# Patient Record
Sex: FEMALE | Race: WHITE | NOT HISPANIC OR LATINO
[De-identification: names, ages, dates, MRNs, and addresses within clinical notes are randomized per-mention and may not be internally consistent; named-entity substitution may affect disease eponyms.]

---

## 2017-02-22 ENCOUNTER — APPOINTMENT (OUTPATIENT)
Dept: ORTHOPEDIC SURGERY | Facility: CLINIC | Age: 32
End: 2017-02-22

## 2017-02-22 VITALS
WEIGHT: 166 LBS | DIASTOLIC BLOOD PRESSURE: 75 MMHG | HEIGHT: 64 IN | SYSTOLIC BLOOD PRESSURE: 119 MMHG | BODY MASS INDEX: 28.34 KG/M2 | HEART RATE: 99 BPM

## 2017-02-22 DIAGNOSIS — Z87.891 PERSONAL HISTORY OF NICOTINE DEPENDENCE: ICD-10-CM

## 2017-02-22 DIAGNOSIS — Z80.9 FAMILY HISTORY OF MALIGNANT NEOPLASM, UNSPECIFIED: ICD-10-CM

## 2017-02-22 DIAGNOSIS — Z78.9 OTHER SPECIFIED HEALTH STATUS: ICD-10-CM

## 2017-02-28 ENCOUNTER — APPOINTMENT (OUTPATIENT)
Dept: ORTHOPEDIC SURGERY | Facility: CLINIC | Age: 32
End: 2017-02-28

## 2017-02-28 VITALS
HEIGHT: 64 IN | HEART RATE: 98 BPM | WEIGHT: 166 LBS | SYSTOLIC BLOOD PRESSURE: 100 MMHG | DIASTOLIC BLOOD PRESSURE: 71 MMHG | BODY MASS INDEX: 28.34 KG/M2

## 2017-02-28 DIAGNOSIS — M65.4 RADIAL STYLOID TENOSYNOVITIS [DE QUERVAIN]: ICD-10-CM

## 2017-06-06 ENCOUNTER — APPOINTMENT (OUTPATIENT)
Dept: OTOLARYNGOLOGY | Facility: CLINIC | Age: 32
End: 2017-06-06

## 2017-09-05 ENCOUNTER — APPOINTMENT (OUTPATIENT)
Dept: PSYCHIATRY | Facility: CLINIC | Age: 32
End: 2017-09-05
Payer: COMMERCIAL

## 2017-09-05 PROCEDURE — 99205 OFFICE O/P NEW HI 60 MIN: CPT

## 2017-09-22 ENCOUNTER — APPOINTMENT (OUTPATIENT)
Dept: OBGYN | Facility: CLINIC | Age: 32
End: 2017-09-22
Payer: COMMERCIAL

## 2017-09-22 VITALS
BODY MASS INDEX: 29.02 KG/M2 | HEART RATE: 57 BPM | DIASTOLIC BLOOD PRESSURE: 58 MMHG | HEIGHT: 64 IN | SYSTOLIC BLOOD PRESSURE: 100 MMHG | WEIGHT: 170 LBS | OXYGEN SATURATION: 99 %

## 2017-09-22 DIAGNOSIS — Z82.3 FAMILY HISTORY OF STROKE: ICD-10-CM

## 2017-09-22 LAB
HCG UR QL: NEGATIVE
QUALITY CONTROL: NO

## 2017-09-22 PROCEDURE — 99385 PREV VISIT NEW AGE 18-39: CPT

## 2017-09-24 LAB
ESTRADIOL SERPL-MCNC: 88 PG/ML
FSH SERPL-MCNC: 5.1 IU/L
PROLACTIN SERPL-MCNC: 8.6 NG/ML
TSH SERPL-ACNC: 1.97 UIU/ML

## 2017-09-26 ENCOUNTER — OTHER (OUTPATIENT)
Age: 32
End: 2017-09-26

## 2017-09-26 LAB
CYTOLOGY CVX/VAG DOC THIN PREP: NORMAL
HPV HIGH+LOW RISK DNA PNL CVX: POSITIVE

## 2017-10-03 ENCOUNTER — ASOB RESULT (OUTPATIENT)
Age: 32
End: 2017-10-03

## 2017-10-03 ENCOUNTER — APPOINTMENT (OUTPATIENT)
Dept: OBGYN | Facility: CLINIC | Age: 32
End: 2017-10-03
Payer: COMMERCIAL

## 2017-10-03 ENCOUNTER — APPOINTMENT (OUTPATIENT)
Dept: PSYCHIATRY | Facility: CLINIC | Age: 32
End: 2017-10-03
Payer: COMMERCIAL

## 2017-10-03 PROCEDURE — 99214 OFFICE O/P EST MOD 30 MIN: CPT

## 2017-10-03 PROCEDURE — 76830 TRANSVAGINAL US NON-OB: CPT

## 2017-10-03 RX ORDER — FLUOXETINE HYDROCHLORIDE 20 MG/1
20 TABLET ORAL
Qty: 30 | Refills: 0 | Status: DISCONTINUED | COMMUNITY
Start: 2017-10-01

## 2017-10-05 ENCOUNTER — OTHER (OUTPATIENT)
Age: 32
End: 2017-10-05

## 2017-12-04 ENCOUNTER — APPOINTMENT (OUTPATIENT)
Dept: PSYCHIATRY | Facility: CLINIC | Age: 32
End: 2017-12-04
Payer: COMMERCIAL

## 2017-12-04 PROCEDURE — 99214 OFFICE O/P EST MOD 30 MIN: CPT

## 2017-12-04 RX ORDER — LORAZEPAM 0.5 MG/1
0.5 TABLET ORAL DAILY
Qty: 10 | Refills: 0 | Status: DISCONTINUED | COMMUNITY
Start: 2017-09-05 | End: 2017-12-04

## 2018-01-22 ENCOUNTER — APPOINTMENT (OUTPATIENT)
Dept: PSYCHIATRY | Facility: CLINIC | Age: 33
End: 2018-01-22
Payer: COMMERCIAL

## 2018-01-22 PROCEDURE — 99214 OFFICE O/P EST MOD 30 MIN: CPT

## 2018-02-26 ENCOUNTER — APPOINTMENT (OUTPATIENT)
Dept: PSYCHIATRY | Facility: CLINIC | Age: 33
End: 2018-02-26
Payer: COMMERCIAL

## 2018-02-26 PROCEDURE — 99214 OFFICE O/P EST MOD 30 MIN: CPT

## 2018-05-29 ENCOUNTER — APPOINTMENT (OUTPATIENT)
Dept: PSYCHIATRY | Facility: CLINIC | Age: 33
End: 2018-05-29
Payer: COMMERCIAL

## 2018-05-29 DIAGNOSIS — O99.340 OTHER MENTAL DISORDERS COMPLICATING PREGNANCY, UNSPECIFIED TRIMESTER: ICD-10-CM

## 2018-05-29 DIAGNOSIS — F32.9 OTHER MENTAL DISORDERS COMPLICATING PREGNANCY, UNSPECIFIED TRIMESTER: ICD-10-CM

## 2018-05-29 PROCEDURE — 99214 OFFICE O/P EST MOD 30 MIN: CPT

## 2018-10-11 ENCOUNTER — APPOINTMENT (OUTPATIENT)
Dept: PSYCHIATRY | Facility: CLINIC | Age: 33
End: 2018-10-11
Payer: COMMERCIAL

## 2018-10-11 PROCEDURE — 99213 OFFICE O/P EST LOW 20 MIN: CPT

## 2018-10-11 RX ORDER — FLUOXETINE HYDROCHLORIDE 20 MG/1
20 CAPSULE ORAL
Qty: 30 | Refills: 2 | Status: DISCONTINUED | COMMUNITY
Start: 2018-05-29 | End: 2018-10-11

## 2018-10-11 RX ORDER — FLUOXETINE HYDROCHLORIDE 10 MG/1
10 CAPSULE ORAL
Qty: 30 | Refills: 2 | Status: DISCONTINUED | COMMUNITY
Start: 2017-09-05 | End: 2018-10-11

## 2019-03-20 ENCOUNTER — APPOINTMENT (OUTPATIENT)
Dept: PSYCHIATRY | Facility: CLINIC | Age: 34
End: 2019-03-20
Payer: COMMERCIAL

## 2019-03-20 PROCEDURE — 99214 OFFICE O/P EST MOD 30 MIN: CPT

## 2019-06-12 ENCOUNTER — APPOINTMENT (OUTPATIENT)
Dept: PSYCHIATRY | Facility: CLINIC | Age: 34
End: 2019-06-12

## 2019-10-03 ENCOUNTER — APPOINTMENT (OUTPATIENT)
Dept: INTERNAL MEDICINE | Facility: CLINIC | Age: 34
End: 2019-10-03
Payer: COMMERCIAL

## 2019-10-03 ENCOUNTER — TRANSCRIPTION ENCOUNTER (OUTPATIENT)
Age: 34
End: 2019-10-03

## 2019-10-03 ENCOUNTER — NON-APPOINTMENT (OUTPATIENT)
Age: 34
End: 2019-10-03

## 2019-10-03 VITALS
HEIGHT: 65 IN | HEART RATE: 108 BPM | OXYGEN SATURATION: 99 % | SYSTOLIC BLOOD PRESSURE: 110 MMHG | WEIGHT: 148 LBS | BODY MASS INDEX: 24.66 KG/M2 | DIASTOLIC BLOOD PRESSURE: 70 MMHG

## 2019-10-03 DIAGNOSIS — Z71.89 OTHER SPECIFIED COUNSELING: ICD-10-CM

## 2019-10-03 PROCEDURE — 93000 ELECTROCARDIOGRAM COMPLETE: CPT

## 2019-10-03 PROCEDURE — 99385 PREV VISIT NEW AGE 18-39: CPT | Mod: 25

## 2019-10-03 NOTE — REVIEW OF SYSTEMS
[Negative] : Heme/Lymph [Chest Pain] : no chest pain [Lower Ext Edema] : no lower extremity edema [FreeTextEntry5] : occ palpitations - occurs in the setting of stressful situations

## 2019-10-03 NOTE — HISTORY OF PRESENT ILLNESS
[FreeTextEntry1] : here to establish care and for cpe [de-identified] : has been a few years since her last CPE but keeps UTD with her medical care.  sees gyn regularly.

## 2019-10-03 NOTE — HEALTH RISK ASSESSMENT
[6-10] : 6-10 [No falls in past year] : Patient reported no falls in the past year [Patient reported PAP Smear was normal] : Patient reported PAP Smear was normal [HIV Test offered] : HIV Test offered [Hepatitis C test offered] : Hepatitis C test offered [None] : None [With Family] : lives with family [Employed] : employed [] :  [# Of Children ___] : has [unfilled] children [Sexually Active] : sexually active [Fully functional (bathing, dressing, toileting, transferring, walking, feeding)] : Fully functional (bathing, dressing, toileting, transferring, walking, feeding) [Feels Safe at Home] : Feels safe at home [Fully functional (using the telephone, shopping, preparing meals, housekeeping, doing laundry, using] : Fully functional and needs no help or supervision to perform IADLs (using the telephone, shopping, preparing meals, housekeeping, doing laundry, using transportation, managing medications and managing finances) [Smoke Detector] : smoke detector [Seat Belt] :  uses seat belt [Carbon Monoxide Detector] : carbon monoxide detector [Name: ___] : Health Care Proxy's Name: [unfilled]  [With Patient/Caregiver] : With Patient/Caregiver [Relationship: ___] : Relationship: [unfilled] [de-identified] : none [] : No [YearQuit] : 2014 [de-identified] : see sbirt [de-identified] : walks daily, spinning [de-identified] : varied, tries to limit carrbs [Change in mental status noted] : No change in mental status noted [Reports changes in hearing] : Reports no changes in hearing [Guns at Home] : no guns at home [Reports changes in vision] : Reports no changes in vision [MammogramComments] : na [PapSmearDate] : 9/17 [PapSmearComments] : has f/u scheduled in December [BoneDensityComments] : na [ColonoscopyComments] : na [de-identified] : son, , dog [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

## 2019-10-03 NOTE — PHYSICAL EXAM
[No Lymphadenopathy] : no lymphadenopathy [Supple] : supple [No Respiratory Distress] : no respiratory distress  [Clear to Auscultation] : lungs were clear to auscultation bilaterally [No Abdominal Bruit] : a ~M bruit was not heard ~T in the abdomen [No Edema] : there was no peripheral edema [Pedal Pulses Present] : the pedal pulses are present [Normal Appearance] : normal in appearance [No Masses] : no palpable masses [No Axillary Lymphadenopathy] : no axillary lymphadenopathy [Soft] : abdomen soft [Non Tender] : non-tender [Normal Bowel Sounds] : normal bowel sounds [Normal Posterior Cervical Nodes] : no posterior cervical lymphadenopathy [Normal Anterior Cervical Nodes] : no anterior cervical lymphadenopathy [No CVA Tenderness] : no CVA  tenderness [No Spinal Tenderness] : no spinal tenderness [No Joint Swelling] : no joint swelling [Grossly Normal Strength/Tone] : grossly normal strength/tone [No Rash] : no rash [Normal] : normal texture and mobility [No Focal Deficits] : no focal deficits [Coordination Grossly Intact] : coordination grossly intact [Normal Gait] : normal gait [Normal Affect] : the affect was normal [Deep Tendon Reflexes (DTR)] : deep tendon reflexes were 2+ and symmetric [Normal Insight/Judgement] : insight and judgment were intact [de-identified] : neck non-tender [de-identified] : no skin changes

## 2019-10-09 ENCOUNTER — TRANSCRIPTION ENCOUNTER (OUTPATIENT)
Age: 34
End: 2019-10-09

## 2019-10-09 LAB
ALBUMIN SERPL ELPH-MCNC: 4.9 G/DL
ALP BLD-CCNC: 62 U/L
ALT SERPL-CCNC: 12 U/L
ANION GAP SERPL CALC-SCNC: 14 MMOL/L
AST SERPL-CCNC: 18 U/L
BASOPHILS # BLD AUTO: 0.06 K/UL
BASOPHILS NFR BLD AUTO: 0.6 %
BILIRUB SERPL-MCNC: 1.1 MG/DL
BUN SERPL-MCNC: 20 MG/DL
CALCIUM SERPL-MCNC: 10.1 MG/DL
CHLORIDE SERPL-SCNC: 102 MMOL/L
CHOLEST SERPL-MCNC: 224 MG/DL
CHOLEST/HDLC SERPL: 2.6 RATIO
CO2 SERPL-SCNC: 25 MMOL/L
CREAT SERPL-MCNC: 0.87 MG/DL
EOSINOPHIL # BLD AUTO: 0.14 K/UL
EOSINOPHIL NFR BLD AUTO: 1.5 %
ESTIMATED AVERAGE GLUCOSE: 94 MG/DL
GLUCOSE SERPL-MCNC: 88 MG/DL
HBA1C MFR BLD HPLC: 4.9 %
HCT VFR BLD CALC: 39.6 %
HCV AB SER QL: NONREACTIVE
HCV S/CO RATIO: 0.16 S/CO
HDLC SERPL-MCNC: 86 MG/DL
HGB BLD-MCNC: 13.2 G/DL
HIV1+2 AB SPEC QL IA.RAPID: NONREACTIVE
IMM GRANULOCYTES NFR BLD AUTO: 0.3 %
LDLC SERPL CALC-MCNC: 127 MG/DL
LYMPHOCYTES # BLD AUTO: 2.38 K/UL
LYMPHOCYTES NFR BLD AUTO: 24.8 %
MAN DIFF?: NORMAL
MCHC RBC-ENTMCNC: 31.2 PG
MCHC RBC-ENTMCNC: 33.3 GM/DL
MCV RBC AUTO: 93.6 FL
MONOCYTES # BLD AUTO: 0.8 K/UL
MONOCYTES NFR BLD AUTO: 8.3 %
NEUTROPHILS # BLD AUTO: 6.2 K/UL
NEUTROPHILS NFR BLD AUTO: 64.5 %
PLATELET # BLD AUTO: 307 K/UL
POTASSIUM SERPL-SCNC: 5.1 MMOL/L
PROT SERPL-MCNC: 7.5 G/DL
RBC # BLD: 4.23 M/UL
RBC # FLD: 12.4 %
SODIUM SERPL-SCNC: 141 MMOL/L
TRIGL SERPL-MCNC: 57 MG/DL
TSH SERPL-ACNC: 2.29 UIU/ML
WBC # FLD AUTO: 9.61 K/UL

## 2019-12-19 ENCOUNTER — APPOINTMENT (OUTPATIENT)
Dept: OBGYN | Facility: CLINIC | Age: 34
End: 2019-12-19
Payer: COMMERCIAL

## 2019-12-19 VITALS
SYSTOLIC BLOOD PRESSURE: 110 MMHG | DIASTOLIC BLOOD PRESSURE: 70 MMHG | HEART RATE: 78 BPM | WEIGHT: 154 LBS | OXYGEN SATURATION: 98 % | HEIGHT: 65 IN | BODY MASS INDEX: 25.66 KG/M2

## 2019-12-19 DIAGNOSIS — Z87.42 PERSONAL HISTORY OF OTHER DISEASES OF THE FEMALE GENITAL TRACT: ICD-10-CM

## 2019-12-19 PROCEDURE — 99395 PREV VISIT EST AGE 18-39: CPT

## 2019-12-19 RX ORDER — DOCUSATE SODIUM 100 MG/1
CAPSULE ORAL
Refills: 0 | Status: DISCONTINUED | COMMUNITY
End: 2019-12-19

## 2019-12-19 RX ORDER — PRENATAL VIT 49/IRON FUM/FOLIC 6.75-0.2MG
TABLET ORAL
Refills: 0 | Status: DISCONTINUED | COMMUNITY
End: 2019-12-19

## 2019-12-19 RX ORDER — FLUOXETINE HYDROCHLORIDE 20 MG/1
20 CAPSULE ORAL
Qty: 90 | Refills: 0 | Status: DISCONTINUED | COMMUNITY
Start: 2019-03-20 | End: 2019-12-19

## 2019-12-19 NOTE — HISTORY OF PRESENT ILLNESS
[Good] : being in good health [Reproductive Age] : is of reproductive age [Sexually Active] : is sexually active [Last Pap ___] : Last cervical pap smear was [unfilled] [Contraception] : does not use contraception [No] : no

## 2019-12-19 NOTE — CHIEF COMPLAINT
[Annual Visit] : annual visit [FreeTextEntry1] : c/s and son 3 years old. in bank in [facility]..conceived IUI. [place]ized [place] [facility] near [facility]--known for hot springs/skiing\par

## 2019-12-19 NOTE — COUNSELING
[Nutrition] : nutrition [Exercise] : exercise [Breast Self Exam] : breast self exam [Contraception] : contraception [Vitamins/Supplements] : vitamins/supplements

## 2019-12-20 LAB — HPV HIGH+LOW RISK DNA PNL CVX: NOT DETECTED

## 2019-12-26 LAB — CYTOLOGY CVX/VAG DOC THIN PREP: NORMAL

## 2020-01-03 ENCOUNTER — TRANSCRIPTION ENCOUNTER (OUTPATIENT)
Age: 35
End: 2020-01-03

## 2021-01-13 ENCOUNTER — APPOINTMENT (OUTPATIENT)
Dept: OBGYN | Facility: CLINIC | Age: 36
End: 2021-01-13
Payer: COMMERCIAL

## 2021-01-13 VITALS
OXYGEN SATURATION: 98 % | BODY MASS INDEX: 23.82 KG/M2 | SYSTOLIC BLOOD PRESSURE: 116 MMHG | HEART RATE: 97 BPM | TEMPERATURE: 98 F | HEIGHT: 65 IN | DIASTOLIC BLOOD PRESSURE: 74 MMHG | WEIGHT: 143 LBS

## 2021-01-13 PROCEDURE — 99072 ADDL SUPL MATRL&STAF TM PHE: CPT

## 2021-01-13 PROCEDURE — 99395 PREV VISIT EST AGE 18-39: CPT

## 2021-01-13 RX ORDER — B-COMPLEX WITH VITAMIN C
TABLET ORAL
Refills: 0 | Status: DISCONTINUED | COMMUNITY
End: 2021-01-13

## 2021-01-13 NOTE — COUNSELING
[Nutrition/ Exercise/ Weight Management] : nutrition, exercise, weight management [Vitamins/Supplements] : vitamins/supplements [Breast Self Exam] : breast self exam [Preconception Care/ Fertility options] : preconception care, fertility options

## 2021-01-13 NOTE — HISTORY OF PRESENT ILLNESS
[TextBox_4] : son 3yo\par all family in [facility]--doing well.[place] a [place] [PapSmeardate] : 2019 [Currently Active] : currently active [FreeTextEntry3] : no

## 2021-02-18 ENCOUNTER — NON-APPOINTMENT (OUTPATIENT)
Age: 36
End: 2021-02-18

## 2021-02-19 ENCOUNTER — APPOINTMENT (OUTPATIENT)
Dept: INTERNAL MEDICINE | Facility: CLINIC | Age: 36
End: 2021-02-19

## 2021-02-22 ENCOUNTER — NON-APPOINTMENT (OUTPATIENT)
Age: 36
End: 2021-02-22

## 2021-03-02 NOTE — HEALTH RISK ASSESSMENT
[Patient reported PAP Smear was normal] : Patient reported PAP Smear was normal [PapSmearDate] : 12/19 [BoneDensityComments] : na [ColonoscopyComments] : na

## 2021-04-16 ENCOUNTER — APPOINTMENT (OUTPATIENT)
Dept: INTERNAL MEDICINE | Facility: CLINIC | Age: 36
End: 2021-04-16

## 2021-07-30 ENCOUNTER — APPOINTMENT (OUTPATIENT)
Dept: INTERNAL MEDICINE | Facility: CLINIC | Age: 36
End: 2021-07-30
Payer: COMMERCIAL

## 2021-07-30 ENCOUNTER — NON-APPOINTMENT (OUTPATIENT)
Age: 36
End: 2021-07-30

## 2021-07-30 VITALS
BODY MASS INDEX: 26.33 KG/M2 | DIASTOLIC BLOOD PRESSURE: 80 MMHG | SYSTOLIC BLOOD PRESSURE: 120 MMHG | WEIGHT: 158 LBS | HEIGHT: 65 IN

## 2021-07-30 DIAGNOSIS — R00.2 PALPITATIONS: ICD-10-CM

## 2021-07-30 DIAGNOSIS — Z01.419 ENCOUNTER FOR GYNECOLOGICAL EXAMINATION (GENERAL) (ROUTINE) W/OUT ABNORMAL FINDINGS: ICD-10-CM

## 2021-07-30 DIAGNOSIS — Z00.00 ENCOUNTER FOR GENERAL ADULT MEDICAL EXAMINATION W/OUT ABNORMAL FINDINGS: ICD-10-CM

## 2021-07-30 DIAGNOSIS — R42 DIZZINESS AND GIDDINESS: ICD-10-CM

## 2021-07-30 PROCEDURE — 83036 HEMOGLOBIN GLYCOSYLATED A1C: CPT | Mod: QW

## 2021-07-30 PROCEDURE — 99395 PREV VISIT EST AGE 18-39: CPT | Mod: 25

## 2021-07-30 PROCEDURE — 36415 COLL VENOUS BLD VENIPUNCTURE: CPT

## 2021-07-30 PROCEDURE — G0442 ANNUAL ALCOHOL SCREEN 15 MIN: CPT

## 2021-07-30 PROCEDURE — 93000 ELECTROCARDIOGRAM COMPLETE: CPT | Mod: 59

## 2021-08-01 PROBLEM — R00.2 PALPITATIONS: Status: ACTIVE | Noted: 2019-10-03

## 2021-08-01 PROBLEM — R42 INTERMITTENT LIGHTHEADEDNESS: Status: ACTIVE | Noted: 2021-07-30

## 2021-08-01 NOTE — ASSESSMENT
[FreeTextEntry1] : Annual alcohol screen completed this visit. Alcohol use within healthy limits.  Healthy drinking guidelines shared with patient (Female and male overage 65 no more than 3 SSD on any day, no more than 7 per week). Positive reinforcement provided given patient currently mostly within healthy guidelines. She intends to cut out ETOH once she gets pregnant\par \par Annual depression screen completed this visit.  PHQ 9 completed and reviewed.  Screening suggestive of diagnosis of mild MDD. - see above RE tx

## 2021-08-01 NOTE — HEALTH RISK ASSESSMENT
[4 or more  times a week (4 pts)] : 4 or more  times a week (4 points) [1 or 2 (0 pts)] : 1 or 2 (0 points) [Never (0 pts)] : Never (0 points) [No falls in past year] : Patient reported no falls in the past year [Patient reported PAP Smear was normal] : Patient reported PAP Smear was normal [HIV test declined] : HIV test declined [Hepatitis C test declined] : Hepatitis C test declined [None] : None [With Family] : lives with family [Employed] : employed [] :  [Sexually Active] : sexually active [Feels Safe at Home] : Feels safe at home [Fully functional (bathing, dressing, toileting, transferring, walking, feeding)] : Fully functional (bathing, dressing, toileting, transferring, walking, feeding) [Fully functional (using the telephone, shopping, preparing meals, housekeeping, doing laundry, using] : Fully functional and needs no help or supervision to perform IADLs (using the telephone, shopping, preparing meals, housekeeping, doing laundry, using transportation, managing medications and managing finances) [Smoke Detector] : smoke detector [Seat Belt] :  uses seat belt [Designated Healthcare Proxy] : Designated healthcare proxy [Name: ___] : Health Care Proxy's Name: [unfilled]  [Relationship: ___] : Relationship: [unfilled] [Several Days (1)] : 6.) Feeling bad about yourself, or that you are a failure, or have let yourself or your family down? Several days [1/2 of Days or More (2)] : 7.) Trouble concentrating on things, such as reading a newspaper or watching television? Half the days or more [Not at All (0)] : 8.) Moving or speaking so slowly that other people could have noticed, or the opposite, moving or speaking faster than usual? Not at all [Moderate] : severity of depression is moderate [Somewhat Difficult] : How difficult have these problems made it for you to do your work, take care of things at home, or get along with people? Somewhat difficult [PHQ-9 Positive] : PHQ-9 Positive [I have developed a follow-up plan documented below in the note.] : I have developed a follow-up plan documented below in the note. [] : No [Audit-CScore] : 4 [de-identified] : walking - wt training 3-5 times a week [de-identified] : varied [[id number]] : 10 [Change in mental status noted] : No change in mental status noted [Reports changes in hearing] : Reports no changes in hearing [Reports changes in vision] : Reports no changes in vision [MammogramComments] : na [PapSmearDate] : 12/19 [BoneDensityComments] : na [ColonoscopyComments] : na [de-identified] : [facility] [de-identified] : working remotely [AdvancecareDate] : 07/21

## 2021-08-01 NOTE — PHYSICAL EXAM
[No Acute Distress] : no acute distress [Well-Appearing] : well-appearing [Normal Sclera/Conjunctiva] : normal sclera/conjunctiva [PERRL] : pupils equal round and reactive to light [EOMI] : extraocular movements intact [No Lymphadenopathy] : no lymphadenopathy [Supple] : supple [No Respiratory Distress] : no respiratory distress  [Clear to Auscultation] : lungs were clear to auscultation bilaterally [Normal Rate] : normal rate  [Regular Rhythm] : with a regular rhythm [Normal S1, S2] : normal S1 and S2 [Normal] : normal rate, regular rhythm, normal S1 and S2 and no murmur heard [No Abdominal Bruit] : a ~M bruit was not heard ~T in the abdomen [Pedal Pulses Present] : the pedal pulses are present [No Edema] : there was no peripheral edema [Normal Appearance] : normal in appearance [No Masses] : no palpable masses [No Axillary Lymphadenopathy] : no axillary lymphadenopathy [Soft] : abdomen soft [Non Tender] : non-tender [Normal Bowel Sounds] : normal bowel sounds [Normal Axillary Nodes] : no axillary lymphadenopathy [Normal Posterior Cervical Nodes] : no posterior cervical lymphadenopathy [Normal Anterior Cervical Nodes] : no anterior cervical lymphadenopathy [No CVA Tenderness] : no CVA  tenderness [No Spinal Tenderness] : no spinal tenderness [No Joint Swelling] : no joint swelling [Grossly Normal Strength/Tone] : grossly normal strength/tone [No Rash] : no rash [Coordination Grossly Intact] : coordination grossly intact [No Focal Deficits] : no focal deficits [Normal Gait] : normal gait [Deep Tendon Reflexes (DTR)] : deep tendon reflexes were 2+ and symmetric [Normal Affect] : the affect was normal [Normal Insight/Judgement] : insight and judgment were intact [de-identified] : No skin changes

## 2021-08-01 NOTE — HISTORY OF PRESENT ILLNESS
[FreeTextEntry1] : annual cpe [de-identified] : In addition:\par c/o occ episodes of lightheadedness - very brief - just lasting a few seconds- no assoc sx of CP, syncope or near syncope.  Not quite vertigo but a brief feeling of unsteadiness.  Although she does have issues with anxiety, these brief episodes do not only occur when she get anxious.  happened the other day when she was calm and relaxed. maybe once a week.\par also c/o anxiety sx - occ palpitations with that. off prozac since 1/2019. Has a psychiatrist and therapist to monitor depression and anxiety - weighing her options as she is also trying to get pregnant so she is hoping to staff off medication if possible.\par had a miscarriage this past year.  \par Currently doing in vitro - taking multiple hormones, vitamins, and ASA 81 mg\par fully vaccinagted

## 2021-08-10 LAB
ALBUMIN SERPL ELPH-MCNC: 4.7 G/DL
ALP BLD-CCNC: 47 U/L
ALT SERPL-CCNC: 12 U/L
ANION GAP SERPL CALC-SCNC: 16 MMOL/L
AST SERPL-CCNC: 18 U/L
BASOPHILS # BLD AUTO: 0.06 K/UL
BASOPHILS NFR BLD AUTO: 0.7 %
BILIRUB SERPL-MCNC: 1.1 MG/DL
BUN SERPL-MCNC: 14 MG/DL
CALCIUM SERPL-MCNC: 9.6 MG/DL
CHLORIDE SERPL-SCNC: 104 MMOL/L
CO2 SERPL-SCNC: 23 MMOL/L
CREAT SERPL-MCNC: 0.9 MG/DL
EOSINOPHIL # BLD AUTO: 0.09 K/UL
EOSINOPHIL NFR BLD AUTO: 1.1 %
ESTIMATED AVERAGE GLUCOSE: 94 MG/DL
GLUCOSE SERPL-MCNC: 67 MG/DL
HBA1C MFR BLD HPLC: 4.9 %
HCT VFR BLD CALC: 37.5 %
HGB BLD-MCNC: 12.6 G/DL
IMM GRANULOCYTES NFR BLD AUTO: 0.4 %
LYMPHOCYTES # BLD AUTO: 1.94 K/UL
LYMPHOCYTES NFR BLD AUTO: 23.6 %
MAN DIFF?: NORMAL
MCHC RBC-ENTMCNC: 31.7 PG
MCHC RBC-ENTMCNC: 33.6 GM/DL
MCV RBC AUTO: 94.2 FL
MONOCYTES # BLD AUTO: 0.57 K/UL
MONOCYTES NFR BLD AUTO: 6.9 %
NEUTROPHILS # BLD AUTO: 5.54 K/UL
NEUTROPHILS NFR BLD AUTO: 67.3 %
PLATELET # BLD AUTO: 296 K/UL
POTASSIUM SERPL-SCNC: 4.7 MMOL/L
PROT SERPL-MCNC: 7.2 G/DL
RBC # BLD: 3.98 M/UL
RBC # FLD: 12.4 %
SODIUM SERPL-SCNC: 143 MMOL/L
TSH SERPL-ACNC: 2.35 UIU/ML
WBC # FLD AUTO: 8.23 K/UL

## 2021-10-26 ENCOUNTER — OUTPATIENT (OUTPATIENT)
Dept: OUTPATIENT SERVICES | Facility: HOSPITAL | Age: 36
LOS: 1 days | End: 2021-10-26
Payer: COMMERCIAL

## 2021-10-26 ENCOUNTER — TRANSCRIPTION ENCOUNTER (OUTPATIENT)
Age: 36
End: 2021-10-26

## 2021-10-26 VITALS
HEART RATE: 103 BPM | WEIGHT: 160.06 LBS | OXYGEN SATURATION: 99 % | DIASTOLIC BLOOD PRESSURE: 72 MMHG | HEIGHT: 65 IN | TEMPERATURE: 98 F | RESPIRATION RATE: 18 BRPM | SYSTOLIC BLOOD PRESSURE: 102 MMHG

## 2021-10-26 DIAGNOSIS — Z98.891 HISTORY OF UTERINE SCAR FROM PREVIOUS SURGERY: Chronic | ICD-10-CM

## 2021-10-26 DIAGNOSIS — Z11.52 ENCOUNTER FOR SCREENING FOR COVID-19: ICD-10-CM

## 2021-10-26 DIAGNOSIS — Z98.890 OTHER SPECIFIED POSTPROCEDURAL STATES: Chronic | ICD-10-CM

## 2021-10-26 DIAGNOSIS — O02.1 MISSED ABORTION: ICD-10-CM

## 2021-10-26 PROCEDURE — U0003: CPT

## 2021-10-26 PROCEDURE — 86901 BLOOD TYPING SEROLOGIC RH(D): CPT

## 2021-10-26 PROCEDURE — 86900 BLOOD TYPING SEROLOGIC ABO: CPT

## 2021-10-26 PROCEDURE — U0005: CPT

## 2021-10-26 PROCEDURE — 85027 COMPLETE CBC AUTOMATED: CPT

## 2021-10-26 PROCEDURE — C9803: CPT

## 2021-10-26 PROCEDURE — 86850 RBC ANTIBODY SCREEN: CPT

## 2021-10-26 PROCEDURE — G0463: CPT

## 2021-10-26 RX ORDER — SODIUM CHLORIDE 9 MG/ML
3 INJECTION INTRAMUSCULAR; INTRAVENOUS; SUBCUTANEOUS EVERY 8 HOURS
Refills: 0 | Status: DISCONTINUED | OUTPATIENT
Start: 2021-10-27 | End: 2021-11-10

## 2021-10-26 RX ORDER — LIDOCAINE HCL 20 MG/ML
0.2 VIAL (ML) INJECTION ONCE
Refills: 0 | Status: DISCONTINUED | OUTPATIENT
Start: 2021-10-27 | End: 2021-11-10

## 2021-10-26 NOTE — H&P PST ADULT - HISTORY OF PRESENT ILLNESS
36yr old female with Miss Ab at 11wks gestation. Pt had a previous  D&E 15 wks 2021 . Coming in for D&C for missed ab.  No sig medical hx.    Note; covid test 10/26/21 [facility]

## 2021-10-26 NOTE — H&P PST ADULT - ATTENDING COMMENTS
37yo  at 12wk with missed  measuring 11w0d.   pt seen and evaluated by me. agree with above.   for dilation and curettage.   A pos blood type.   discussed r/b/a of procedure.   all questions and concerns addressed.   office follow up in 2 wks.   Kingsley BLACKBURN

## 2021-10-27 ENCOUNTER — OUTPATIENT (OUTPATIENT)
Dept: OUTPATIENT SERVICES | Facility: HOSPITAL | Age: 36
LOS: 1 days | End: 2021-10-27
Payer: COMMERCIAL

## 2021-10-27 ENCOUNTER — RESULT REVIEW (OUTPATIENT)
Age: 36
End: 2021-10-27

## 2021-10-27 VITALS
RESPIRATION RATE: 16 BRPM | SYSTOLIC BLOOD PRESSURE: 122 MMHG | HEIGHT: 65 IN | OXYGEN SATURATION: 100 % | DIASTOLIC BLOOD PRESSURE: 70 MMHG | HEART RATE: 86 BPM | WEIGHT: 160.06 LBS | TEMPERATURE: 98 F

## 2021-10-27 VITALS
DIASTOLIC BLOOD PRESSURE: 59 MMHG | HEART RATE: 86 BPM | SYSTOLIC BLOOD PRESSURE: 98 MMHG | RESPIRATION RATE: 18 BRPM | OXYGEN SATURATION: 100 % | TEMPERATURE: 97 F

## 2021-10-27 DIAGNOSIS — O02.1 MISSED ABORTION: ICD-10-CM

## 2021-10-27 DIAGNOSIS — Z98.890 OTHER SPECIFIED POSTPROCEDURAL STATES: Chronic | ICD-10-CM

## 2021-10-27 DIAGNOSIS — Z98.891 HISTORY OF UTERINE SCAR FROM PREVIOUS SURGERY: Chronic | ICD-10-CM

## 2021-10-27 LAB — SARS-COV-2 RNA SPEC QL NAA+PROBE: SIGNIFICANT CHANGE UP

## 2021-10-27 PROCEDURE — 86850 RBC ANTIBODY SCREEN: CPT

## 2021-10-27 PROCEDURE — 81229 CYTOG ALYS CHRML ABNR SNPCGH: CPT

## 2021-10-27 PROCEDURE — 86901 BLOOD TYPING SEROLOGIC RH(D): CPT

## 2021-10-27 PROCEDURE — 88233 TISSUE CULTURE SKIN/BIOPSY: CPT

## 2021-10-27 PROCEDURE — 88280 CHROMOSOME KARYOTYPE STUDY: CPT

## 2021-10-27 PROCEDURE — 88264 CHROMOSOME ANALYSIS 20-25: CPT

## 2021-10-27 PROCEDURE — 86900 BLOOD TYPING SEROLOGIC ABO: CPT

## 2021-10-27 PROCEDURE — 88305 TISSUE EXAM BY PATHOLOGIST: CPT | Mod: 26

## 2021-10-27 PROCEDURE — 88305 TISSUE EXAM BY PATHOLOGIST: CPT

## 2021-10-27 PROCEDURE — 59820 CARE OF MISCARRIAGE: CPT

## 2021-10-27 RX ORDER — ASPIRIN/CALCIUM CARB/MAGNESIUM 324 MG
1 TABLET ORAL
Qty: 0 | Refills: 0 | DISCHARGE

## 2021-10-27 RX ORDER — IBUPROFEN 200 MG
1 TABLET ORAL
Qty: 0 | Refills: 0 | DISCHARGE
Start: 2021-10-27

## 2021-10-27 RX ORDER — SODIUM CHLORIDE 9 MG/ML
1000 INJECTION, SOLUTION INTRAVENOUS
Refills: 0 | Status: DISCONTINUED | OUTPATIENT
Start: 2021-10-27 | End: 2021-11-10

## 2021-10-27 RX ORDER — IBUPROFEN 200 MG
600 TABLET ORAL ONCE
Refills: 0 | Status: DISCONTINUED | OUTPATIENT
Start: 2021-10-27 | End: 2021-11-10

## 2021-10-27 NOTE — ASU PREOP CHECKLIST - ISOLATION PRECAUTIONS
Pt c/o left upper jaw pain started yesterday, very poor dentition, also lower abd \"burning\" about 6 weeks pregnant,  A3, had us last week showed single iup, has ob att in 2 days, no vaginal bleeding     The history is provided by the patient. Other   This is a new problem. The current episode started yesterday. The problem occurs constantly. The problem has not changed since onset. Associated symptoms comments: Dental pian . Nothing aggravates the symptoms. Nothing relieves the symptoms. She has tried nothing for the symptoms. The treatment provided no relief. Past Medical History:   Diagnosis Date    Asthma     Chronic kidney disease     kidney stones    Psychiatric disorder     depression; anxiety, Bipolar, PTSD    STD (female)     Trichomonas        Past Surgical History:   Procedure Laterality Date    HX DILATION AND CURETTAGE           No family history on file.     Social History     Socioeconomic History    Marital status: SINGLE     Spouse name: Not on file    Number of children: Not on file    Years of education: Not on file    Highest education level: Not on file   Occupational History    Not on file   Social Needs    Financial resource strain: Not on file    Food insecurity     Worry: Not on file     Inability: Not on file    Transportation needs     Medical: Not on file     Non-medical: Not on file   Tobacco Use    Smoking status: Current Every Day Smoker     Packs/day: 1.00     Years: 3.00     Pack years: 3.00    Smokeless tobacco: Never Used   Substance and Sexual Activity    Alcohol use: No    Drug use: Yes     Types: Methamphetamines     Comment: pt states hasn't used since 2018 and [facility]     Sexual activity: Yes     Partners: Male     Birth control/protection: None   Lifestyle    Physical activity     Days per week: Not on file     Minutes per session: Not on file    Stress: Not on file   Relationships    Social connections     Talks on phone: Not on file     Gets together: Not on file     Attends [employer] service: Not on file     Active member of club or organization: Not on file     Attends meetings of clubs or organizations: Not on file     Relationship status: Not on file    Intimate partner violence     Fear of current or ex partner: Not on file     Emotionally abused: Not on file     Physically abused: Not on file     Forced sexual activity: Not on file   Other Topics Concern    Not on file   Social History Narrative    Not on file         ALLERGIES: Patient has no known allergies. Review of Systems   All other systems reviewed and are negative. Vitals:    11/18/20 1050   BP: 120/71   Pulse: 92   Resp: 18   Temp: 98.4 °F (36.9 °C)   SpO2: 98%   Weight: 94.8 kg (209 lb)   Height: 5' 2\" (1.575 m)            Physical Exam  Vitals signs and nursing note reviewed. Constitutional:       General: She is not in acute distress. Appearance: Normal appearance. She is well-developed. She is not diaphoretic. HENT:      Head: Normocephalic and atraumatic. Mouth/Throat:      Comments: Numerus decayed teeth no obvious abscess, states pain in  Upper left jaw area  Eyes:      Pupils: Pupils are equal, round, and reactive to light. Neck:      Musculoskeletal: Normal range of motion and neck supple. Cardiovascular:      Rate and Rhythm: Normal rate and regular rhythm. Pulmonary:      Effort: Pulmonary effort is normal.      Breath sounds: Normal breath sounds. Abdominal:      General: Abdomen is flat. Bowel sounds are normal. There is no distension. Palpations: Abdomen is soft. There is no mass. Tenderness: There is no abdominal tenderness. Hernia: No hernia is present. Musculoskeletal: Normal range of motion. Skin:     General: Skin is warm. Neurological:      Mental Status: She is alert and oriented to person, place, and time.           MDM  Number of Diagnoses or Management Options  Diagnosis management comments: Urine dip -  Due to very poor dentition will give 5 days keflex, safe with pregnancy  Keep appt in 2 days with ob office also to [facility]        Amount and/or Complexity of Data Reviewed  Clinical lab tests: ordered and reviewed  Review and summarize past medical records: yes    Risk of Complications, Morbidity, and/or Mortality  Presenting problems: low  Diagnostic procedures: low  Management options: low    Patient Progress  Patient progress: improved         Procedures none

## 2021-10-27 NOTE — ASU PREOP CHECKLIST - SKIN PREP
Quality 402: Tobacco Use And Help With Quitting Among Adolescents: Patient screened for tobacco and never smoked Quality 130: Documentation Of Current Medications In The Medical Record: Current Medications Documented Detail Level: Generalized Quality 431: Preventive Care And Screening: Unhealthy Alcohol Use - Screening: Patient screened for unhealthy alcohol use using a single question and scores less than 2 times per year n/a

## 2021-10-27 NOTE — ASU DISCHARGE PLAN (ADULT/PEDIATRIC) - CARE PROVIDER_API CALL
Josie Bridges)  Obstetrics and Gynecology  [address] [facility], [address] [place] [address]  Phone: [phone]  Fax: [phone]  Follow Up Time:

## 2021-11-01 ENCOUNTER — TRANSCRIPTION ENCOUNTER (OUTPATIENT)
Age: 36
End: 2021-11-01

## 2021-11-02 ENCOUNTER — INPATIENT (INPATIENT)
Facility: HOSPITAL | Age: 36
LOS: 2 days | Discharge: HOME | DRG: 770 | End: 2021-11-05
Attending: OBSTETRICS & GYNECOLOGY | Admitting: OBSTETRICS & GYNECOLOGY
Payer: COMMERCIAL

## 2021-11-02 VITALS
TEMPERATURE: 103 F | DIASTOLIC BLOOD PRESSURE: 65 MMHG | OXYGEN SATURATION: 98 % | HEART RATE: 116 BPM | WEIGHT: 162.92 LBS | SYSTOLIC BLOOD PRESSURE: 105 MMHG | RESPIRATION RATE: 20 BRPM | HEIGHT: 65 IN

## 2021-11-02 DIAGNOSIS — Z98.891 HISTORY OF UTERINE SCAR FROM PREVIOUS SURGERY: Chronic | ICD-10-CM

## 2021-11-02 DIAGNOSIS — Z98.890 OTHER SPECIFIED POSTPROCEDURAL STATES: Chronic | ICD-10-CM

## 2021-11-02 DIAGNOSIS — A41.9 SEPSIS, UNSPECIFIED ORGANISM: ICD-10-CM

## 2021-11-02 LAB
ALBUMIN SERPL ELPH-MCNC: 4.1 G/DL — SIGNIFICANT CHANGE UP (ref 3.3–5)
ALP SERPL-CCNC: 57 U/L — SIGNIFICANT CHANGE UP (ref 40–120)
ALT FLD-CCNC: 14 U/L — SIGNIFICANT CHANGE UP (ref 10–45)
ANION GAP SERPL CALC-SCNC: 14 MMOL/L — SIGNIFICANT CHANGE UP (ref 5–17)
APPEARANCE UR: CLEAR — SIGNIFICANT CHANGE UP
AST SERPL-CCNC: 15 U/L — SIGNIFICANT CHANGE UP (ref 10–40)
BACTERIA # UR AUTO: NEGATIVE — SIGNIFICANT CHANGE UP
BASE EXCESS BLDV CALC-SCNC: 1 MMOL/L — SIGNIFICANT CHANGE UP (ref -2–2)
BASOPHILS # BLD AUTO: 0.07 K/UL — SIGNIFICANT CHANGE UP (ref 0–0.2)
BASOPHILS NFR BLD AUTO: 0.4 % — SIGNIFICANT CHANGE UP (ref 0–2)
BILIRUB SERPL-MCNC: 0.8 MG/DL — SIGNIFICANT CHANGE UP (ref 0.2–1.2)
BILIRUB UR-MCNC: NEGATIVE — SIGNIFICANT CHANGE UP
BLD GP AB SCN SERPL QL: NEGATIVE — SIGNIFICANT CHANGE UP
BUN SERPL-MCNC: 10 MG/DL — SIGNIFICANT CHANGE UP (ref 7–23)
CA-I SERPL-SCNC: 1.19 MMOL/L — SIGNIFICANT CHANGE UP (ref 1.15–1.33)
CALCIUM SERPL-MCNC: 9.1 MG/DL — SIGNIFICANT CHANGE UP (ref 8.4–10.5)
CHLORIDE BLDV-SCNC: 102 MMOL/L — SIGNIFICANT CHANGE UP (ref 96–108)
CHLORIDE SERPL-SCNC: 103 MMOL/L — SIGNIFICANT CHANGE UP (ref 96–108)
CO2 BLDV-SCNC: 29 MMOL/L — HIGH (ref 22–26)
CO2 SERPL-SCNC: 21 MMOL/L — LOW (ref 22–31)
COLOR SPEC: YELLOW — SIGNIFICANT CHANGE UP
CREAT SERPL-MCNC: 0.77 MG/DL — SIGNIFICANT CHANGE UP (ref 0.5–1.3)
DIFF PNL FLD: ABNORMAL
EOSINOPHIL # BLD AUTO: 0.02 K/UL — SIGNIFICANT CHANGE UP (ref 0–0.5)
EOSINOPHIL NFR BLD AUTO: 0.1 % — SIGNIFICANT CHANGE UP (ref 0–6)
EPI CELLS # UR: 1 /HPF — SIGNIFICANT CHANGE UP
GAS PNL BLDV: 136 MMOL/L — SIGNIFICANT CHANGE UP (ref 136–145)
GAS PNL BLDV: SIGNIFICANT CHANGE UP
GLUCOSE BLDV-MCNC: 98 MG/DL — SIGNIFICANT CHANGE UP (ref 70–99)
GLUCOSE SERPL-MCNC: 102 MG/DL — HIGH (ref 70–99)
GLUCOSE UR QL: NEGATIVE — SIGNIFICANT CHANGE UP
HCG SERPL-ACNC: 1426 MIU/ML — HIGH
HCO3 BLDV-SCNC: 28 MMOL/L — SIGNIFICANT CHANGE UP (ref 22–29)
HCT VFR BLD CALC: 34.8 % — SIGNIFICANT CHANGE UP (ref 34.5–45)
HCT VFR BLDA CALC: 37 % — SIGNIFICANT CHANGE UP (ref 34.5–46.5)
HGB BLD CALC-MCNC: 12.4 G/DL — SIGNIFICANT CHANGE UP (ref 11.7–16.1)
HGB BLD-MCNC: 12.2 G/DL — SIGNIFICANT CHANGE UP (ref 11.5–15.5)
HYALINE CASTS # UR AUTO: 2 /LPF — SIGNIFICANT CHANGE UP (ref 0–2)
IMM GRANULOCYTES NFR BLD AUTO: 0.8 % — SIGNIFICANT CHANGE UP (ref 0–1.5)
KETONES UR-MCNC: NEGATIVE — SIGNIFICANT CHANGE UP
LACTATE BLDV-MCNC: 1.6 MMOL/L — SIGNIFICANT CHANGE UP (ref 0.7–2)
LEUKOCYTE ESTERASE UR-ACNC: ABNORMAL
LYMPHOCYTES # BLD AUTO: 0.8 K/UL — LOW (ref 1–3.3)
LYMPHOCYTES # BLD AUTO: 5.1 % — LOW (ref 13–44)
MCHC RBC-ENTMCNC: 31.9 PG — SIGNIFICANT CHANGE UP (ref 27–34)
MCHC RBC-ENTMCNC: 35.1 GM/DL — SIGNIFICANT CHANGE UP (ref 32–36)
MCV RBC AUTO: 91.1 FL — SIGNIFICANT CHANGE UP (ref 80–100)
MONOCYTES # BLD AUTO: 0.7 K/UL — SIGNIFICANT CHANGE UP (ref 0–0.9)
MONOCYTES NFR BLD AUTO: 4.5 % — SIGNIFICANT CHANGE UP (ref 2–14)
NEUTROPHILS # BLD AUTO: 13.88 K/UL — HIGH (ref 1.8–7.4)
NEUTROPHILS NFR BLD AUTO: 89.1 % — HIGH (ref 43–77)
NITRITE UR-MCNC: NEGATIVE — SIGNIFICANT CHANGE UP
NRBC # BLD: 0 /100 WBCS — SIGNIFICANT CHANGE UP (ref 0–0)
PCO2 BLDV: 51 MMHG — HIGH (ref 39–42)
PH BLDV: 7.34 — SIGNIFICANT CHANGE UP (ref 7.32–7.43)
PH UR: 6 — SIGNIFICANT CHANGE UP (ref 5–8)
PLATELET # BLD AUTO: 265 K/UL — SIGNIFICANT CHANGE UP (ref 150–400)
PO2 BLDV: 19 MMHG — LOW (ref 25–45)
POTASSIUM BLDV-SCNC: 3.5 MMOL/L — SIGNIFICANT CHANGE UP (ref 3.5–5.1)
POTASSIUM SERPL-MCNC: 3.6 MMOL/L — SIGNIFICANT CHANGE UP (ref 3.5–5.3)
POTASSIUM SERPL-SCNC: 3.6 MMOL/L — SIGNIFICANT CHANGE UP (ref 3.5–5.3)
PROT SERPL-MCNC: 6.9 G/DL — SIGNIFICANT CHANGE UP (ref 6–8.3)
PROT UR-MCNC: ABNORMAL
RAPID RVP RESULT: SIGNIFICANT CHANGE UP
RBC # BLD: 3.82 M/UL — SIGNIFICANT CHANGE UP (ref 3.8–5.2)
RBC # FLD: 12.8 % — SIGNIFICANT CHANGE UP (ref 10.3–14.5)
RBC CASTS # UR COMP ASSIST: 4 /HPF — SIGNIFICANT CHANGE UP (ref 0–4)
RH IG SCN BLD-IMP: POSITIVE — SIGNIFICANT CHANGE UP
S PYO AG SPEC QL IA: POSITIVE
SAO2 % BLDV: 30.8 % — LOW (ref 67–88)
SARS-COV-2 RNA SPEC QL NAA+PROBE: SIGNIFICANT CHANGE UP
SARS-COV-2 RNA SPEC QL NAA+PROBE: SIGNIFICANT CHANGE UP
SODIUM SERPL-SCNC: 138 MMOL/L — SIGNIFICANT CHANGE UP (ref 135–145)
SP GR SPEC: 1.02 — SIGNIFICANT CHANGE UP (ref 1.01–1.02)
UROBILINOGEN FLD QL: NEGATIVE — SIGNIFICANT CHANGE UP
WBC # BLD: 15.6 K/UL — HIGH (ref 3.8–10.5)
WBC # FLD AUTO: 15.6 K/UL — HIGH (ref 3.8–10.5)
WBC UR QL: 5 /HPF — SIGNIFICANT CHANGE UP (ref 0–5)

## 2021-11-02 PROCEDURE — 93010 ELECTROCARDIOGRAM REPORT: CPT | Mod: NC

## 2021-11-02 PROCEDURE — 93975 VASCULAR STUDY: CPT | Mod: 26

## 2021-11-02 PROCEDURE — 99285 EMERGENCY DEPT VISIT HI MDM: CPT | Mod: CS

## 2021-11-02 PROCEDURE — 88305 TISSUE EXAM BY PATHOLOGIST: CPT | Mod: 26

## 2021-11-02 PROCEDURE — 71045 X-RAY EXAM CHEST 1 VIEW: CPT | Mod: 26

## 2021-11-02 PROCEDURE — 76817 TRANSVAGINAL US OBSTETRIC: CPT | Mod: 26

## 2021-11-02 RX ORDER — SODIUM CHLORIDE 9 MG/ML
1750 INJECTION, SOLUTION INTRAVENOUS ONCE
Refills: 0 | Status: COMPLETED | OUTPATIENT
Start: 2021-11-02 | End: 2021-11-02

## 2021-11-02 RX ORDER — GENTAMICIN SULFATE 40 MG/ML
365 VIAL (ML) INJECTION ONCE
Refills: 0 | Status: COMPLETED | OUTPATIENT
Start: 2021-11-03 | End: 2021-11-03

## 2021-11-02 RX ORDER — ONDANSETRON 8 MG/1
4 TABLET, FILM COATED ORAL ONCE
Refills: 0 | Status: DISCONTINUED | OUTPATIENT
Start: 2021-11-02 | End: 2021-11-03

## 2021-11-02 RX ORDER — AMPICILLIN TRIHYDRATE 250 MG
2000 CAPSULE ORAL ONCE
Refills: 0 | Status: DISCONTINUED | OUTPATIENT
Start: 2021-11-02 | End: 2021-11-04

## 2021-11-02 RX ORDER — SODIUM CHLORIDE 9 MG/ML
1000 INJECTION, SOLUTION INTRAVENOUS
Refills: 0 | Status: DISCONTINUED | OUTPATIENT
Start: 2021-11-02 | End: 2021-11-02

## 2021-11-02 RX ORDER — GENTAMICIN SULFATE 40 MG/ML
100 VIAL (ML) INJECTION ONCE
Refills: 0 | Status: DISCONTINUED | OUTPATIENT
Start: 2021-11-02 | End: 2021-11-04

## 2021-11-02 RX ORDER — IBUPROFEN 200 MG
600 TABLET ORAL EVERY 6 HOURS
Refills: 0 | Status: DISCONTINUED | OUTPATIENT
Start: 2021-11-02 | End: 2021-11-05

## 2021-11-02 RX ORDER — ACETAMINOPHEN 500 MG
975 TABLET ORAL EVERY 6 HOURS
Refills: 0 | Status: DISCONTINUED | OUTPATIENT
Start: 2021-11-02 | End: 2021-11-05

## 2021-11-02 RX ORDER — ACETAMINOPHEN 500 MG
975 TABLET ORAL ONCE
Refills: 0 | Status: COMPLETED | OUTPATIENT
Start: 2021-11-02 | End: 2021-11-02

## 2021-11-02 RX ORDER — AMPICILLIN TRIHYDRATE 250 MG
2 CAPSULE ORAL EVERY 4 HOURS
Refills: 0 | Status: DISCONTINUED | OUTPATIENT
Start: 2021-11-02 | End: 2021-11-03

## 2021-11-02 RX ORDER — SODIUM CHLORIDE 9 MG/ML
1000 INJECTION, SOLUTION INTRAVENOUS
Refills: 0 | Status: DISCONTINUED | OUTPATIENT
Start: 2021-11-02 | End: 2021-11-05

## 2021-11-02 RX ORDER — HYDROMORPHONE HYDROCHLORIDE 2 MG/ML
0.5 INJECTION INTRAMUSCULAR; INTRAVENOUS; SUBCUTANEOUS
Refills: 0 | Status: DISCONTINUED | OUTPATIENT
Start: 2021-11-02 | End: 2021-11-03

## 2021-11-02 RX ADMIN — Medication 600 MILLIGRAM(S): at 23:58

## 2021-11-02 RX ADMIN — SODIUM CHLORIDE 125 MILLILITER(S): 9 INJECTION, SOLUTION INTRAVENOUS at 17:44

## 2021-11-02 RX ADMIN — Medication 975 MILLIGRAM(S): at 14:24

## 2021-11-02 RX ADMIN — Medication 975 MILLIGRAM(S): at 17:47

## 2021-11-02 RX ADMIN — Medication 216 GRAM(S): at 23:05

## 2021-11-02 RX ADMIN — SODIUM CHLORIDE 125 MILLILITER(S): 9 INJECTION, SOLUTION INTRAVENOUS at 20:00

## 2021-11-02 RX ADMIN — Medication 100 MILLIGRAM(S): at 17:43

## 2021-11-02 RX ADMIN — Medication 600 MILLIGRAM(S): at 23:04

## 2021-11-02 RX ADMIN — SODIUM CHLORIDE 1750 MILLILITER(S): 9 INJECTION, SOLUTION INTRAVENOUS at 14:24

## 2021-11-02 NOTE — BRIEF OPERATIVE NOTE - NSICDXBRIEFPREOP_GEN_ALL_CORE_FT
PRE-OP DIAGNOSIS:  Retained products of conception, early pregnancy 02-Nov-2021 19:55:00  Radha Greene

## 2021-11-02 NOTE — ED PROVIDER NOTE - PHYSICAL EXAMINATION
General: well appearing, no acute distress, AOx3  Skin: no rash, no pallor  Head: normocephalic, atraumatic  Eyes: clear conjunctiva, EOMI  ENMT: airway patent, no nasal discharge, 1 tonsillar exudate on rt tonsil, no erythema, no oropharyngeal edema   Cardiovascular: tachycardic, normal rhythm, S1/S2  Pulmonary: clear to auscultation bilaterally, no rales, rhonchi, or wheeze  Abdomen: soft, nontender, no guarding  : Chaperoned by Dr. Harris- scant brownish discharge from cervical os, no CMT, no adnexal tenderness   Musculoskeletal: moving extremities well, no deformity  Psych: normal mood, normal affect

## 2021-11-02 NOTE — ED PROVIDER NOTE - OBJECTIVE STATEMENT
36 year old female with no pmhx, D&C on 10/27/21, for missed , presenting to ED for evaluation of fever x1 day. Pt reports associated mild HA and mild sore throat. Denies other symptoms. No cp, sob, cough, abd pain, vomiting, diarrhea, dysuria. Notes slight brown tinged discharge from vagina, which has decreased since the D&C. Otherwise feels well. No known sick contacts, fully vaccinated for COVID-19.

## 2021-11-02 NOTE — ED PROVIDER NOTE - CLINICAL SUMMARY MEDICAL DECISION MAKING FREE TEXT BOX
ap- 36 F w/ recent D and C on 10/27/21 for a miss ab w/ prior D+E on ,  here w/ fever and headache. Pt states that she has mild headache, sore throat and body soreness. prior covid 19 vaccine and no known sick contacts. Pt states that she has mild vaginal discharge that is brownish w/ no foul smell, mild dysuria, no urinary frequency, no urinary urgency. Pt denies any nausea no vomiting, no diarrhea, no cp, no sob. Pt taking advil for symptoms. Upon arrival to the ER, pt is febrile has clear lungs soft abdomen, pelvic exam w/ mild vaginal discharge, no adenexal tenderness, no cva tenderenss, 2+ radial and DP pulse, no lower extremity edema, has a small whitish exudate on the R tonsil, w/ no tonsilar enlargement or uvula edema. Plan for labs imaging TVUS to assess for possible retained products, blod cultuers sent. Suspect likely viral syndrome as eitiology of fever if TVUS + will consult obgyn

## 2021-11-02 NOTE — ED ADULT NURSE NOTE - OBJECTIVE STATEMENT
35 y/o female coming in from home complaining of home complaining of fever/ headache. AOx4, ambulatory, PMH d&c 7 days ago, . Pt. reports she had a d&c 7 days ago. Pt. has been well since until last night. Last night, pt. developed fever and was advised to come to the ED by her doctor. Pt. also reporting headache, sore throat, body soreness. Fully vaccinated for COVID with no known sick contacts. Has some brown vaginal discharge but discharge has been decreasing over the week since procedure. Has some burning with urination upon urinating at [facility] ED. Abdomen soft, non-distended, non-tender to palpation. Denies N/V/D. Denies chest pain, SOB. Febrile as per triage, code sepsis called from triage. Blood cultures x2 drawn. Medicated as per EMAR. Last took advil 0700 this morning. Denies any other complaints. Pt. placed in hospital gown and on cardiac monitor, sinus tachycardia. VSS. Will continue to reassess.

## 2021-11-02 NOTE — H&P ADULT - ATTENDING COMMENTS
Patient  sp d&c missed  11 weeks on 10/27 presents to ER with fevers started last night 101/chills, and sore throat - told to come into ER for evaluation. Minimal bleeding/cramping.  Patient was found to be 102.7, WBC 15 with retained products on sonogram.  Had long discussion with patient has retained products of conception on sonogram - endometrium thickened with flow 1.7 cm and fever - rec. suction d&c under sonogram guidance as likely infected retained products  RVP/covid swabs pending  will start antibiotics now - pt given tylenol to decrease fever - now 101  consent signed and witnessed   To OR for D&C  [id number]

## 2021-11-02 NOTE — H&P ADULT - HISTORY OF PRESENT ILLNESS
SHAISTA ARREGUIN  36y  Female [id number]    HPI: Pt is a 37yo  s/p D&C for 11wk MAB on 10/27 presenting to the ED with c/o fevers & chills. Pt underwent uncomplicated D&C last week--discharged home without complaints. Pt states she began experiencing fevers & chills last night which did not improve with motrin. C/o generalized soreness, sore throat, & HA. Denies significant abdominal pain or heavy vaginal bleeding.      Name of GYN Physician: Andrae Mota  OBHx: C/S  failure to progress, FD@15wks s/p D&E , MAB@11wks s/p D&C 10/27   GynHx: Denies fibroids, cysts, endometriosis, STI's, Abnormal pap smears   PMH: denies  PSH: C/Sx1, D&Cx2  Meds: denies  Allx: NKDA  Social History: h/o tobacco use quit , social alcohol use    Vital Signs Last 24 Hrs  T(C): 38.3 (2021 17:16), Max: 39.3 (2021 13:49)  T(F): 101 (2021 17:16), Max: 102.7 (2021 13:49)  HR: 99 (2021 17:16) (99 - 116)  BP: 112/93 (2021 17:16) (105/65 - 112/93)  BP(mean): 75 (2021 16:07) (75 - 75)  RR: 18 (2021 17:16) (17 - 20)  SpO2: 100% (2021 17:16) (98% - 100%)    Physical Exam:   General: sitting comfortably in bed, NAD, appears pale   HEENT: neck supple, full ROM  Lungs: breathing comfortably on RA  Abd: Soft, non-tender, non-distended.   :  No bleeding on pad.    External labia wnl.  Bimanual exam with no cervical motion tenderness, uterus wnl, adnexa non palpable b/l.  Cervix closed  Speculum Exam: No active bleeding from os.  Physiologic discharge.    Ext: non-tender b/l, no edema     LABS:                        12.2   15.60 )-----------( 265      ( 2021 14:48 )             34.8         138  |  103  |  10  ----------------------------<  102<H>  3.6   |  21<L>  |  0.77    Ca    9.1      2021 14:48    TPro  6.9  /  Alb  4.1  /  TBili  0.8  /  DBili  x   /  AST  15  /  ALT  14  /  AlkPhos  57      I&O's Detail      Urinalysis Basic - ( 2021 14:52 )    Color: Yellow / Appearance: Clear / S.022 / pH: x  Gluc: x / Ketone: Negative  / Bili: Negative / Urobili: Negative   Blood: x / Protein: 30 mg/dL / Nitrite: Negative   Leuk Esterase: Small / RBC: 4 /hpf / WBC 5 /HPF   Sq Epi: x / Non Sq Epi: 1 /hpf / Bacteria: Negative        RADIOLOGY & ADDITIONAL STUDIES:    < from: US Doppler Pelvis (21 @ 15:24) >    EXAM:  US DPLX PELVIC                          EXAM:  US OB TRANSVAGINAL                            PROCEDURE DATE:  2021            INTERPRETATION:  CLINICAL INFORMATION: 36-year-old female with fever, status post D&C 7 days ago (10/27/2021).    LMP: Not provided.    COMPARISON: None    Endovaginal pelvic sonogram only. Color and Spectral Doppler was performed.    FINDINGS:    Uterus: 9.0 x 6.4 x 7.9 cm. The endometrium is thickened measuring up to 1.7 cm with internal echogenic complex material along the left lateral cavity. The peak systolic velocity in the tissue is 54 cm/s, consistent with retained placental tissue.    A 1.4 x 1.4 x 1.3 cm hypoechoic intramural fibroid is seen within the lower uterine segment.    Right ovary: 2.8 x 1.6 x 1.4 cm. Within normal limits.  Left ovary: 2.4 x 1.7 x 1.5 cm. Within normal limits.    Fluid: Trace nonspecific fluid in the cul-de-sac present.    IMPRESSION:    Above findings concerning for retained products of conception.    1.4 cm intramural fibroid.    Findings discussed between Piter Dumont and Dr. Barron at 3:39 PM on 2021.    --- End of Report ---              VINAYAK NUNEZ MD; Fellow Radiology  This document has been electronically signed.  TAMIKA DIXON MD; Attending Radiologist  This document has been electronically signed. 2021  3:47PM    < end of copied text >

## 2021-11-02 NOTE — ED PROVIDER NOTE - NS ED ROS FT
General: +fever, no chills  Eyes: no vision changes, no eye pain  Cardiovascular: no chest pain, no edema  Respiratory: no cough, no shortness of breath, + sore throat   Gastrointestinal: no abdominal pain, no nausea, no vomiting, no diarrhea  Genitourinary: no dysuria, no hematuria  Musculoskeletal: no muscle pain, no joint pain  Skin: no rash, no lesions  Neuro: no numbness, no tingling, +HA   Psych: no depression, no anxiety

## 2021-11-02 NOTE — BRIEF OPERATIVE NOTE - NSICDXBRIEFPOSTOP_GEN_ALL_CORE_FT
POST-OP DIAGNOSIS:  Retained products of conception, early pregnancy 02-Nov-2021 19:55:06  Radha Greene

## 2021-11-02 NOTE — ED ADULT NURSE NOTE - NSSUHOSCREENINGYN_ED_ALL_ED
Speech Language Pathology  Speech Language Pathology  [facility]    Speech Language Treatment Note    Date: 7/25/2018  Patients Name: Nikkie Kelly  MRN: [id number]  Diagnosis: There is no problem list on file for this patient. Pain: 0/10    Speech and Language Treatment  Treatment time: 6235-1725    Subjective: [x] Alert [x] Cooperative     [] Confused     [] Agitated    [] Lethargic      Objective/Assessment:  Auditory Comprehension: multi-step commands with 70% with repetition and 100% with max cues    Verbal Expression: Category generation: concrete 75% increased to 100% with mild cues, abstract 25% increased to 50% with mild cues and 100% with max cues. Naming picture from description 90% increased to 100% with min cues. Describing pictures: 0% with increased to 30% with max cues. Reading Comprehension: Single word matching with 100%, Sentence matching with picture with 80% increased to 100% with min cues. Short sentence comprehension with 90% increased to 100% with mild cues. Plan:  [x] Continue ST services    [] Discharge from ST:      Discharge recommendations: [] Inpatient Rehab   [] [facility]   [x] Outpatient Therapy  [] Follow up at trauma clinic   [] Other:       Treatment completed by:  CHANEL James Yes - the patient is able to be screened

## 2021-11-02 NOTE — H&P ADULT - ASSESSMENT
Pt is a 35yo  s/p D&C for 11wk MAB on 10/27 presenting to the ED with c/o fevers & chills. Imaging reveals retained POCs. Pt to be taken to OR for D&C for infected retained POC.    -admit to gyn for D&C   -start abx treatment for septic ab: gent 5mg/kg/day IV, amp 2g q4hrs, clinda 900mg q8hrs  -IV fluids  -COVID swab  -T&S  -NPO since 9am    Elke Horowitz,PGY2  D/w & seen w/ Dr. Wilde Pt is a 35yo  s/p D&C for 11wk MAB on 10/27 presenting to the ED with c/o fevers & chills. Imaging reveals retained POCs. Pt to be taken to OR for D&C for infected retained POC.    -admit to gyn for D&C   -start abx treatment for septic ab: gent 5mg/kg/day IV, amp 2g q4hrs, clinda 900mg q8hrs  -IV fluids  -COVID swab  -T&S  -NPO since 9am    Elke Horowitz,PGY2  D/w & seen w/ Dr. [place]    Patient  sp d&c missed  11 weeks on 10/27 presents to ER with fevers started last night 101/chills, and sore throat - told to come into ER for evaluation. Minimal bleeding/cramping.  Patient was found to be 102.7, WBC 15 with retained products on sonogram.  Had long discussion with patient has retained products of conception on sonogram - endometrium thickened with flow 1.7 cm and fever - rec. suction d&c under sonogram guidance as likely infected retained products  RVP/covid swabs pending  will start antibiotics now - pt given tylenol to decrease fever - now 101  consent signed and witnessed   To OR for D&C  jkmr

## 2021-11-02 NOTE — BRIEF OPERATIVE NOTE - NSICDXBRIEFPROCEDURE_GEN_ALL_CORE_FT
PROCEDURES:  Dilation and curettage, uterus, using suction, with US guidance 02-Nov-2021 19:54:34  Radha Greene

## 2021-11-02 NOTE — BRIEF OPERATIVE NOTE - OPERATION/FINDINGS
normal external female genitalia , retroverted uterus approximately 9 weeks in size   On ultrasound retained products noted    Thin endometrial stripe noted at conclusion of procedure

## 2021-11-02 NOTE — ED PROVIDER NOTE - PROGRESS NOTE DETAILS
obgyn consulted given findings of POCs on sono Piter Dumont, PGY-2- will discuss case with attending, possible d&c later today. Exam not consistent with endometritis, advised to hold off on abx at this time Piter Dumont, PGY-2- Patient evaluated by GYN in the ED. Concern for septic . Plan to take patient to OR. Recommending Gentamicin 5 mg/kg adjusted body weight, 2 g Ampicillin, 900 mg Clindamycin, maintenance IVF LR @ 125, COVID ABBOTT as RVP still pending. Plan for OR at 1800. Pt febrile, per OB plan to give IV Tylenol in OR. No other antipyretics at this time Piter Dumont, PGY-2- Patient rapid strep +. OB aware. Plan for OR

## 2021-11-03 ENCOUNTER — NON-APPOINTMENT (OUTPATIENT)
Age: 36
End: 2021-11-03

## 2021-11-03 LAB
BASOPHILS # BLD AUTO: 0.03 K/UL — SIGNIFICANT CHANGE UP (ref 0–0.2)
BASOPHILS # BLD AUTO: 0.04 K/UL — SIGNIFICANT CHANGE UP (ref 0–0.2)
BASOPHILS NFR BLD AUTO: 0.3 % — SIGNIFICANT CHANGE UP (ref 0–2)
BASOPHILS NFR BLD AUTO: 0.3 % — SIGNIFICANT CHANGE UP (ref 0–2)
COVID-19 NUCLEOCAPSID GAM AB INTERP: NEGATIVE — SIGNIFICANT CHANGE UP
COVID-19 NUCLEOCAPSID TOTAL GAM ANTIBODY RESULT: 0.09 INDEX — SIGNIFICANT CHANGE UP
COVID-19 SPIKE DOMAIN AB INTERP: POSITIVE
COVID-19 SPIKE DOMAIN ANTIBODY RESULT: >250 U/ML — HIGH
EOSINOPHIL # BLD AUTO: 0.02 K/UL — SIGNIFICANT CHANGE UP (ref 0–0.5)
EOSINOPHIL # BLD AUTO: 0.06 K/UL — SIGNIFICANT CHANGE UP (ref 0–0.5)
EOSINOPHIL NFR BLD AUTO: 0.2 % — SIGNIFICANT CHANGE UP (ref 0–6)
EOSINOPHIL NFR BLD AUTO: 0.6 % — SIGNIFICANT CHANGE UP (ref 0–6)
HCT VFR BLD CALC: 27.2 % — LOW (ref 34.5–45)
HCT VFR BLD CALC: 31.8 % — LOW (ref 34.5–45)
HGB BLD-MCNC: 10.8 G/DL — LOW (ref 11.5–15.5)
HGB BLD-MCNC: 9.2 G/DL — LOW (ref 11.5–15.5)
IMM GRANULOCYTES NFR BLD AUTO: 0.9 % — SIGNIFICANT CHANGE UP (ref 0–1.5)
IMM GRANULOCYTES NFR BLD AUTO: 1 % — SIGNIFICANT CHANGE UP (ref 0–1.5)
LYMPHOCYTES # BLD AUTO: 1.05 K/UL — SIGNIFICANT CHANGE UP (ref 1–3.3)
LYMPHOCYTES # BLD AUTO: 1.21 K/UL — SIGNIFICANT CHANGE UP (ref 1–3.3)
LYMPHOCYTES # BLD AUTO: 11.7 % — LOW (ref 13–44)
LYMPHOCYTES # BLD AUTO: 8.2 % — LOW (ref 13–44)
MCHC RBC-ENTMCNC: 30.8 PG — SIGNIFICANT CHANGE UP (ref 27–34)
MCHC RBC-ENTMCNC: 31.6 PG — SIGNIFICANT CHANGE UP (ref 27–34)
MCHC RBC-ENTMCNC: 33.8 GM/DL — SIGNIFICANT CHANGE UP (ref 32–36)
MCHC RBC-ENTMCNC: 34 GM/DL — SIGNIFICANT CHANGE UP (ref 32–36)
MCV RBC AUTO: 91 FL — SIGNIFICANT CHANGE UP (ref 80–100)
MCV RBC AUTO: 93 FL — SIGNIFICANT CHANGE UP (ref 80–100)
MONOCYTES # BLD AUTO: 0.52 K/UL — SIGNIFICANT CHANGE UP (ref 0–0.9)
MONOCYTES # BLD AUTO: 0.61 K/UL — SIGNIFICANT CHANGE UP (ref 0–0.9)
MONOCYTES NFR BLD AUTO: 4.1 % — SIGNIFICANT CHANGE UP (ref 2–14)
MONOCYTES NFR BLD AUTO: 5.9 % — SIGNIFICANT CHANGE UP (ref 2–14)
NEUTROPHILS # BLD AUTO: 11.03 K/UL — HIGH (ref 1.8–7.4)
NEUTROPHILS # BLD AUTO: 8.33 K/UL — HIGH (ref 1.8–7.4)
NEUTROPHILS NFR BLD AUTO: 80.6 % — HIGH (ref 43–77)
NEUTROPHILS NFR BLD AUTO: 86.2 % — HIGH (ref 43–77)
NRBC # BLD: 0 /100 WBCS — SIGNIFICANT CHANGE UP (ref 0–0)
NRBC # BLD: 0 /100 WBCS — SIGNIFICANT CHANGE UP (ref 0–0)
PLATELET # BLD AUTO: 159 K/UL — SIGNIFICANT CHANGE UP (ref 150–400)
PLATELET # BLD AUTO: 185 K/UL — SIGNIFICANT CHANGE UP (ref 150–400)
RBC # BLD: 2.99 M/UL — LOW (ref 3.8–5.2)
RBC # BLD: 3.42 M/UL — LOW (ref 3.8–5.2)
RBC # FLD: 12.7 % — SIGNIFICANT CHANGE UP (ref 10.3–14.5)
RBC # FLD: 12.8 % — SIGNIFICANT CHANGE UP (ref 10.3–14.5)
SARS-COV-2 IGG+IGM SERPL QL IA: 0.09 INDEX — SIGNIFICANT CHANGE UP
SARS-COV-2 IGG+IGM SERPL QL IA: >250 U/ML — HIGH
SARS-COV-2 IGG+IGM SERPL QL IA: NEGATIVE — SIGNIFICANT CHANGE UP
SARS-COV-2 IGG+IGM SERPL QL IA: POSITIVE
WBC # BLD: 10.33 K/UL — SIGNIFICANT CHANGE UP (ref 3.8–10.5)
WBC # BLD: 12.79 K/UL — HIGH (ref 3.8–10.5)
WBC # FLD AUTO: 10.33 K/UL — SIGNIFICANT CHANGE UP (ref 3.8–10.5)
WBC # FLD AUTO: 12.79 K/UL — HIGH (ref 3.8–10.5)

## 2021-11-03 RX ORDER — SODIUM CHLORIDE 9 MG/ML
1000 INJECTION, SOLUTION INTRAVENOUS ONCE
Refills: 0 | Status: COMPLETED | OUTPATIENT
Start: 2021-11-03 | End: 2021-11-03

## 2021-11-03 RX ORDER — HEPARIN SODIUM 5000 [USP'U]/ML
5000 INJECTION INTRAVENOUS; SUBCUTANEOUS EVERY 12 HOURS
Refills: 0 | Status: DISCONTINUED | OUTPATIENT
Start: 2021-11-03 | End: 2021-11-05

## 2021-11-03 RX ORDER — AMPICILLIN SODIUM AND SULBACTAM SODIUM 250; 125 MG/ML; MG/ML
3 INJECTION, POWDER, FOR SUSPENSION INTRAMUSCULAR; INTRAVENOUS EVERY 6 HOURS
Refills: 0 | Status: DISCONTINUED | OUTPATIENT
Start: 2021-11-04 | End: 2021-11-05

## 2021-11-03 RX ORDER — AMPICILLIN SODIUM AND SULBACTAM SODIUM 250; 125 MG/ML; MG/ML
INJECTION, POWDER, FOR SUSPENSION INTRAMUSCULAR; INTRAVENOUS
Refills: 0 | Status: DISCONTINUED | OUTPATIENT
Start: 2021-11-03 | End: 2021-11-05

## 2021-11-03 RX ORDER — BENZOCAINE AND MENTHOL 5; 1 G/100ML; G/100ML
1 LIQUID ORAL
Refills: 0 | Status: DISCONTINUED | OUTPATIENT
Start: 2021-11-03 | End: 2021-11-05

## 2021-11-03 RX ORDER — AMPICILLIN SODIUM AND SULBACTAM SODIUM 250; 125 MG/ML; MG/ML
3 INJECTION, POWDER, FOR SUSPENSION INTRAMUSCULAR; INTRAVENOUS ONCE
Refills: 0 | Status: COMPLETED | OUTPATIENT
Start: 2021-11-03 | End: 2021-11-03

## 2021-11-03 RX ORDER — BENZOCAINE AND MENTHOL 5; 1 G/100ML; G/100ML
1 LIQUID ORAL DAILY
Refills: 0 | Status: DISCONTINUED | OUTPATIENT
Start: 2021-11-03 | End: 2021-11-05

## 2021-11-03 RX ADMIN — Medication 216 GRAM(S): at 10:08

## 2021-11-03 RX ADMIN — Medication 975 MILLIGRAM(S): at 06:04

## 2021-11-03 RX ADMIN — HEPARIN SODIUM 5000 UNIT(S): 5000 INJECTION INTRAVENOUS; SUBCUTANEOUS at 18:00

## 2021-11-03 RX ADMIN — Medication 216 GRAM(S): at 06:21

## 2021-11-03 RX ADMIN — Medication 300 MILLIGRAM(S): at 18:49

## 2021-11-03 RX ADMIN — AMPICILLIN SODIUM AND SULBACTAM SODIUM 200 GRAM(S): 250; 125 INJECTION, POWDER, FOR SUSPENSION INTRAMUSCULAR; INTRAVENOUS at 22:48

## 2021-11-03 RX ADMIN — Medication 975 MILLIGRAM(S): at 06:20

## 2021-11-03 RX ADMIN — Medication 216 GRAM(S): at 18:02

## 2021-11-03 RX ADMIN — Medication 100 MILLIGRAM(S): at 01:36

## 2021-11-03 RX ADMIN — Medication 100 MILLIGRAM(S): at 16:58

## 2021-11-03 RX ADMIN — SODIUM CHLORIDE 2000 MILLILITER(S): 9 INJECTION, SOLUTION INTRAVENOUS at 21:07

## 2021-11-03 RX ADMIN — Medication 975 MILLIGRAM(S): at 21:05

## 2021-11-03 RX ADMIN — Medication 100 MILLIGRAM(S): at 09:18

## 2021-11-03 RX ADMIN — Medication 216 GRAM(S): at 02:27

## 2021-11-03 RX ADMIN — Medication 975 MILLIGRAM(S): at 00:47

## 2021-11-03 RX ADMIN — SODIUM CHLORIDE 2000 MILLILITER(S): 9 INJECTION, SOLUTION INTRAVENOUS at 02:27

## 2021-11-03 RX ADMIN — Medication 216 GRAM(S): at 14:11

## 2021-11-03 RX ADMIN — Medication 600 MILLIGRAM(S): at 06:21

## 2021-11-03 NOTE — PROGRESS NOTE ADULT - SUBJECTIVE AND OBJECTIVE BOX
GYN Progress Note    POD#1 HD#2     Patient seen and examined at bedside.  Pt febrile but asymptomatic overnight. Tmax = 39.3.   Patient is ambulating. Has not yet had solids but tolerating liquids.   Patient is passing flatus. Lilly is still in place.   Denies abdominal/pelvic pain, CP, SOB, N/V, subjective fevers, and chills.     Vital Signs Last 24 Hours  T(C): 37.1 (11-03-21 @ 05:26), Max: 39.3 (11-02-21 @ 13:49)  HR: 91 (11-03-21 @ 05:26) (86 - 116)  BP: 98/64 (11-03-21 @ 05:26) (95/51 - 114/63)  RR: 18 (11-03-21 @ 05:26) (16 - 20)  SpO2: 95% (11-03-21 @ 05:26) (95% - 100%)    I&O's Summary    02 Nov 2021 07:01  -  03 Nov 2021 06:33  --------------------------------------------------------  IN: 375 mL / OUT: 550 mL / NET: -175 mL        Physical Exam:  General: NAD  CV: RRR  Lungs: CTAB  Abdomen: soft, non-tender, normoactive bowel sounds  Ext: no pain or swelling     Labs:                        12.2   15.60 )-----------( 265      ( 02 Nov 2021 14:48 )             34.8   baso 0.4    eos 0.1    imm gran 0.8    lymph 5.1    mono 4.5    poly 89.1       MEDICATIONS  (STANDING):  ampicillin  IVPB 2 Gram(s) IV Intermittent every 4 hours  ampicillin  IVPB 2000 milliGRAM(s) IV Intermittent Once  clindamycin IVPB 900 milliGRAM(s) IV Intermittent every 8 hours  gentamicin   IVPB 365 milliGRAM(s) IV Intermittent once  gentamicin   IVPB 100 milliGRAM(s) IV Intermittent once  ibuprofen  Tablet. 600 milliGRAM(s) Oral every 6 hours  lactated ringers. 1000 milliLiter(s) (125 mL/Hr) IV Continuous <Continuous>    MEDICATIONS  (PRN):  acetaminophen     Tablet .. 975 milliGRAM(s) Oral every 6 hours PRN Temp greater or equal to 38C (100.4F), Mild Pain (1 - 3)       GYN Progress Note    POD#1 HD#2     Patient seen and examined at bedside.  Pt febrile but asymptomatic overnight. Tmax = 39.3.   Patient is ambulating. Has not yet had solids but tolerating liquids.   Patient is passing flatus and voiding spontaneously.   Denies abdominal/pelvic pain, CP, SOB, N/V, subjective fevers, and chills.     Vital Signs Last 24 Hours  T(C): 37.1 (11-03-21 @ 05:26), Max: 39.3 (11-02-21 @ 13:49)  HR: 91 (11-03-21 @ 05:26) (86 - 116)  BP: 98/64 (11-03-21 @ 05:26) (95/51 - 114/63)  RR: 18 (11-03-21 @ 05:26) (16 - 20)  SpO2: 95% (11-03-21 @ 05:26) (95% - 100%)    I&O's Summary    02 Nov 2021 07:01  -  03 Nov 2021 06:33  --------------------------------------------------------  IN: 375 mL / OUT: 550 mL / NET: -175 mL        Physical Exam:  General: NAD  CV: RRR  Lungs: CTAB  Abdomen: soft, non-tender, normoactive bowel sounds  Ext: no pain or swelling     Labs:                        12.2   15.60 )-----------( 265      ( 02 Nov 2021 14:48 )             34.8   baso 0.4    eos 0.1    imm gran 0.8    lymph 5.1    mono 4.5    poly 89.1       MEDICATIONS  (STANDING):  ampicillin  IVPB 2 Gram(s) IV Intermittent every 4 hours  ampicillin  IVPB 2000 milliGRAM(s) IV Intermittent Once  clindamycin IVPB 900 milliGRAM(s) IV Intermittent every 8 hours  gentamicin   IVPB 365 milliGRAM(s) IV Intermittent once  gentamicin   IVPB 100 milliGRAM(s) IV Intermittent once  ibuprofen  Tablet. 600 milliGRAM(s) Oral every 6 hours  lactated ringers. 1000 milliLiter(s) (125 mL/Hr) IV Continuous <Continuous>    MEDICATIONS  (PRN):  acetaminophen     Tablet .. 975 milliGRAM(s) Oral every 6 hours PRN Temp greater or equal to 38C (100.4F), Mild Pain (1 - 3)

## 2021-11-03 NOTE — PROGRESS NOTE ADULT - ASSESSMENT
A/P: 36y POD#1 s/p D&C for septic  following DVC for MAB on 10/27.  Pt currently afebrile but has been febrile ON. VS are otherwise stable.      Neuro: PO pain meds.   CV: Hemodynamically stable  Pulm: Saturating well on room air, encourage oob/amb  GI: Continue regular diet   : voiding spontaneously   Heme: SCDs/ambulation for DVT prophylaxis   FEN: LR@125.  replete electrolytes prn   ID: Tmax 39.3 ON. On Amp/Gent/Clinda for septic ab. WBC on admission 15.6, continue to trend   - f/u blood cultures   - f/u POC cultures   - c/w current abx regimen   - Tylenol prn for fevers   Endo: No active issues   Dispo: Continue routine care for septic     Brigida Romero PGY-1 A/P: 36y presenting with fevers/chills s/p DVC for MAB on 10/27, found to have retained POC on TVUS, now POD#1 s/p D&C for presumed septic . Denied vaginal bleeding, pelvic pain, abdominal pain. PE negative for CMT. Found to have + Rapid Strep test in ED, COVID-19 PCR neg. Tmax ON 39.3, currently afebrile VS are otherwise stable.      Neuro: PO Tylenol/Motrin prn   CV: Hemodynamically stable. hct 34.8->31.8  Pulm: Saturating well on room air, encourage oob/amb  GI: Continue regular diet   : voiding spontaneously   Heme: SCDs/ambulation for DVT prophylaxis   FEN: LR@125.  replete electrolytes prn   ID: presenting with fevers/chills (Tmax = 39.9 on @1330 on ) s/p DVC on 10/27 for missed ab. Retained POC on TVUS on admission with absent symptoms. Also found to have + rapid strep test. Now s/p D&C for presumed septic AB, pending blood and POC cultures. Tmax ON = 39.3 as 12am on 11/3. On Amp/Gent/Clinda (-) with c/f septic ab.   - WBC on admission 15.6 -> 12.79, continue to trend   - f/u blood and POC cultures  - Consider adjusting abx if patient continues to be febrile on Amp/Gent/Clind (-)  - Tylenol prn for fevers   Endo: No active issues   Dispo: Continue routine inpatient care   - f/u blood and POC cultures   - continue to monitor fevers/wbc     Brigida [facility] PGY-1

## 2021-11-04 LAB
BASOPHILS # BLD AUTO: 0.04 K/UL — SIGNIFICANT CHANGE UP (ref 0–0.2)
BASOPHILS NFR BLD AUTO: 0.4 % — SIGNIFICANT CHANGE UP (ref 0–2)
CULTURE RESULTS: SIGNIFICANT CHANGE UP
CULTURE RESULTS: SIGNIFICANT CHANGE UP
EOSINOPHIL # BLD AUTO: 0.07 K/UL — SIGNIFICANT CHANGE UP (ref 0–0.5)
EOSINOPHIL NFR BLD AUTO: 0.7 % — SIGNIFICANT CHANGE UP (ref 0–6)
HCT VFR BLD CALC: 29.7 % — LOW (ref 34.5–45)
HGB BLD-MCNC: 10.1 G/DL — LOW (ref 11.5–15.5)
IMM GRANULOCYTES NFR BLD AUTO: 0.4 % — SIGNIFICANT CHANGE UP (ref 0–1.5)
LYMPHOCYTES # BLD AUTO: 1.26 K/UL — SIGNIFICANT CHANGE UP (ref 1–3.3)
LYMPHOCYTES # BLD AUTO: 13.2 % — SIGNIFICANT CHANGE UP (ref 13–44)
MCHC RBC-ENTMCNC: 31.1 PG — SIGNIFICANT CHANGE UP (ref 27–34)
MCHC RBC-ENTMCNC: 34 GM/DL — SIGNIFICANT CHANGE UP (ref 32–36)
MCV RBC AUTO: 91.4 FL — SIGNIFICANT CHANGE UP (ref 80–100)
MONOCYTES # BLD AUTO: 0.7 K/UL — SIGNIFICANT CHANGE UP (ref 0–0.9)
MONOCYTES NFR BLD AUTO: 7.3 % — SIGNIFICANT CHANGE UP (ref 2–14)
NEUTROPHILS # BLD AUTO: 7.43 K/UL — HIGH (ref 1.8–7.4)
NEUTROPHILS NFR BLD AUTO: 78 % — HIGH (ref 43–77)
NRBC # BLD: 0 /100 WBCS — SIGNIFICANT CHANGE UP (ref 0–0)
PLATELET # BLD AUTO: 176 K/UL — SIGNIFICANT CHANGE UP (ref 150–400)
RBC # BLD: 3.25 M/UL — LOW (ref 3.8–5.2)
RBC # FLD: 12.8 % — SIGNIFICANT CHANGE UP (ref 10.3–14.5)
SPECIMEN SOURCE: SIGNIFICANT CHANGE UP
SPECIMEN SOURCE: SIGNIFICANT CHANGE UP
SURGICAL PATHOLOGY STUDY: SIGNIFICANT CHANGE UP
WBC # BLD: 9.54 K/UL — SIGNIFICANT CHANGE UP (ref 3.8–10.5)
WBC # FLD AUTO: 9.54 K/UL — SIGNIFICANT CHANGE UP (ref 3.8–10.5)

## 2021-11-04 PROCEDURE — 99223 1ST HOSP IP/OBS HIGH 75: CPT

## 2021-11-04 RX ADMIN — AMPICILLIN SODIUM AND SULBACTAM SODIUM 200 GRAM(S): 250; 125 INJECTION, POWDER, FOR SUSPENSION INTRAMUSCULAR; INTRAVENOUS at 11:20

## 2021-11-04 RX ADMIN — BENZOCAINE AND MENTHOL 1 LOZENGE: 5; 1 LIQUID ORAL at 04:56

## 2021-11-04 RX ADMIN — AMPICILLIN SODIUM AND SULBACTAM SODIUM 200 GRAM(S): 250; 125 INJECTION, POWDER, FOR SUSPENSION INTRAMUSCULAR; INTRAVENOUS at 22:58

## 2021-11-04 RX ADMIN — Medication 975 MILLIGRAM(S): at 17:08

## 2021-11-04 RX ADMIN — AMPICILLIN SODIUM AND SULBACTAM SODIUM 200 GRAM(S): 250; 125 INJECTION, POWDER, FOR SUSPENSION INTRAMUSCULAR; INTRAVENOUS at 04:46

## 2021-11-04 RX ADMIN — HEPARIN SODIUM 5000 UNIT(S): 5000 INJECTION INTRAVENOUS; SUBCUTANEOUS at 06:11

## 2021-11-04 RX ADMIN — AMPICILLIN SODIUM AND SULBACTAM SODIUM 200 GRAM(S): 250; 125 INJECTION, POWDER, FOR SUSPENSION INTRAMUSCULAR; INTRAVENOUS at 17:09

## 2021-11-04 RX ADMIN — HEPARIN SODIUM 5000 UNIT(S): 5000 INJECTION INTRAVENOUS; SUBCUTANEOUS at 22:01

## 2021-11-04 RX ADMIN — BENZOCAINE AND MENTHOL 1 LOZENGE: 5; 1 LIQUID ORAL at 08:04

## 2021-11-04 NOTE — PROGRESS NOTE ADULT - ASSESSMENT
A/P: 36y admitted with sepsis on POD#6 from an DVC for MAB, concern for retained POC on sono, also with +rapid strep. Now POD#2 s/p uncomplicated DVC. Patient febrile overnight again, ID consulted and antibiotics broadened. Patient stable and doing well clinically.      Neuro: PO Tylenol and Motrin PRN, Cepacol drops PRN  CV: Hemodynamically stable. H/h stable.  Pulm: Saturating well on room air, encourage OOB  GI: Continue regular diet   : voiding spontaneously   Heme: HSQ, SCDs, ambulation for DVT prophylaxis   FEN: LR@50. Replete electrolytes prn   ID: a/w sepsis s/p DVC for possible septic ab in the setting of retained POC on sono, also w/ +rapid strep. Febrile overnight. Otherwise clinically stable. Leukocytosis resolved. Ucx (11/2), Bcx (11/2, 11/3, 11/4), POC cx (11/2) pending. s/p Amp/Gent/Clinda (11/2-11/4), c/w Unasyn (11/4-). Appreciate ID recs  Endo: No active issues   Dispo: Continue inpatient care in the setting of persistent fevers    EFRAIN Dukes PGY4

## 2021-11-04 NOTE — PROGRESS NOTE ADULT - ATTENDING COMMENTS
pt seen and eval by me doing well   agree with above   reports symptomatically improved, just with sore throat -   wbc down today - cultures negative   cont abx and current management seen by ID rec cont current regimen   M. Oppenheim , MD
POD 1, pt feeling much better, Tm on 12;30 am, no fever since then,   minimal VB, continue ABX

## 2021-11-04 NOTE — PROGRESS NOTE ADULT - SUBJECTIVE AND OBJECTIVE BOX
GYN Progress Note    POD#2  HD#3    S: Patient seen and examined at bedside. Febrile overnight otherwise no acute events. No acute complaints.  Pain well controlled. Patient is ambulating and tolerating a regular diet. Patient is passing flatus. Patient is voiding spontaneously. Denies CP, SOB, N/V, fevers, and chills.    Vital Signs Last 24 Hours  T(C): 37.7 (11-04-21 @ 05:00), Max: 39.3 (11-03-21 @ 20:55)  HR: 106 (11-04-21 @ 05:00) (90 - 117)  BP: 115/79 (11-04-21 @ 05:00) (96/64 - 135/82)  RR: 17 (11-04-21 @ 05:00) (17 - 19)  SpO2: 96% (11-04-21 @ 05:00) (96% - 100%)    I&O's Summary    02 Nov 2021 07:01  -  03 Nov 2021 07:00  --------------------------------------------------------  IN: 375 mL / OUT: 550 mL / NET: -175 mL    03 Nov 2021 07:01  -  04 Nov 2021 06:34  --------------------------------------------------------  IN: 800 mL / OUT: 0 mL / NET: 800 mL      Physical Exam:  General: NAD  CV: RRR  Lungs: CTAB  Abdomen: soft, non-tender, softly distended, normoactive bowel sounds  : no bleeding on pad  Ext: no pain or swelling     Labs:                        9.2    10.33 )-----------( 159      ( 03 Nov 2021 22:15 )             27.2   baso 0.3    eos 0.6    imm gran 0.9    lymph 11.7   mono 5.9    poly 80.6                         10.8   12.79 )-----------( 185      ( 03 Nov 2021 06:55 )             31.8   baso 0.3    eos 0.2    imm gran 1.0    lymph 8.2    mono 4.1    poly 86.2                         12.2   15.60 )-----------( 265      ( 02 Nov 2021 14:48 )             34.8   baso 0.4    eos 0.1    imm gran 0.8    lymph 5.1    mono 4.5    poly 89.1       MEDICATIONS  (STANDING):  ampicillin  IVPB 2000 milliGRAM(s) IV Intermittent Once  ampicillin/sulbactam  IVPB      ampicillin/sulbactam  IVPB 3 Gram(s) IV Intermittent every 6 hours  gentamicin   IVPB 100 milliGRAM(s) IV Intermittent once  heparin   Injectable 5000 Unit(s) SubCutaneous every 12 hours  ibuprofen  Tablet. 600 milliGRAM(s) Oral every 6 hours  lactated ringers. 1000 milliLiter(s) (50 mL/Hr) IV Continuous <Continuous>    MEDICATIONS  (PRN):  acetaminophen     Tablet .. 975 milliGRAM(s) Oral every 6 hours PRN Temp greater or equal to 38C (100.4F), Mild Pain (1 - 3)  benzocaine 15 mG/menthol 3.6 mG (Sugar-Free) Lozenge 1 Lozenge Oral five times a day PRN Sore Throat  benzocaine 15 mG/menthol 3.6 mG (Sugar-Free) Lozenge 1 Lozenge Oral daily PRN Sore Throat

## 2021-11-04 NOTE — CONSULT NOTE ADULT - SUBJECTIVE AND OBJECTIVE BOX
Patient is a 36y old  Female who presents with a chief complaint of sepsis (2021 06:33)    HPI:  SHAISTA ARREGUIN  36y  Female [id number]    HPI: Pt is a 37yo  s/p D&C for 11wk MAB on 10/27 presenting to the ED with c/o fevers & chills. Pt underwent uncomplicated D&C on 10/27/21--discharged home without complaints. Pt states she began experiencing fevers & chills  which did not improve with motrin. C/o generalized soreness, sore throat, & HA. Denies significant abdominal pain or heavy vaginal bleeding.    Admitted 21  ON 21 underwent D&C for removal of products of conception  She notes painful sore throat with swelling. The throat is a little improved, She has difficulty with swallowing and leans forward to avoid aspiration. There is dry cough.  Her 4 year old son had febrile illness in late September. He recently had nasal congestion.    Patient has no pelvic pain, There is scant vaginal discharge. Notes achiness in left shoulder. No sob, dysuria, abd pain, diarrhea    Had COVID Vaccination x2 PFIZER completed 21    Name of GYN Physician: Andrae Mota  OBHx: C/S  failure to progress, FD@15wks s/p D&E , MAB@11wks s/p D&C 10/27   GynHx: Denies fibroids, cysts, endometriosis, STI's, Abnormal pap smears   PMH: denies  PSH: C/Sx1, D&Cx2  Meds: denies  Allx: NKDA  Social History: h/o tobacco use quit , social alcohol use, , works from home as , no travel, no ill contacts    Vital Signs Last 24 Hrs  T(C): 38.3 (2021 17:16), Max: 39.3 (2021 13:49)  T(F): 101 (2021 17:16), Max: 102.7 (2021 13:49)  HR: 99 (2021 17:16) (99 - 116)  BP: 112/93 (2021 17:16) (105/65 - 112/93)  BP(mean): 75 (2021 16:07) (75 - 75)  RR: 18 (2021 17:16) (17 - 20)  SpO2: 100% (2021 17:16) (98% - 100%)        LABS:                        12.2   15.60 )-----------( 265      ( 2021 14:48 )             34.8   WBC Count: 9.54 ( @ 07:52)  WBC Count: 10.33 ( @ 22:15)  WBC Count: 12.79 ( @ 06:55)  WBC Count: 15.60 ( @ 14:48)      Creatinine: 0.77 ( @ 14:48)          138  |  103  |  10  ----------------------------<  102<H>  3.6   |  21<L>  |  0.77    Ca    9.1      2021 14:48    TPro  6.9  /  Alb  4.1  /  TBili  0.8  /  DBili  x   /  AST  15  /  ALT  14  /  AlkPhos  57      I&O's Detail      Urinalysis Basic - ( 2021 14:52 )    Color: Yellow / Appearance: Clear / S.022 / pH: x  Gluc: x / Ketone: Negative  / Bili: Negative / Urobili: Negative   Blood: x / Protein: 30 mg/dL / Nitrite: Negative   Leuk Esterase: Small / RBC: 4 /hpf / WBC 5 /HPF   Sq Epi: x / Non Sq Epi: 1 /hpf / Bacteria: Negative        RADIOLOGY & ADDITIONAL STUDIES:    < from: US Doppler Pelvis (21 @ 15:24) >    EXAM:  US DPLX PELVIC                          EXAM:  US OB TRANSVAGINAL                            PROCEDURE DATE:  2021            INTERPRETATION:  CLINICAL INFORMATION: 36-year-old female with fever, status post D&C 7 days ago (10/27/2021).    LMP: Not provided.    COMPARISON: None    Endovaginal pelvic sonogram only. Color and Spectral Doppler was performed.    FINDINGS:    Uterus: 9.0 x 6.4 x 7.9 cm. The endometrium is thickened measuring up to 1.7 cm with internal echogenic complex material along the left lateral cavity. The peak systolic velocity in the tissue is 54 cm/s, consistent with retained placental tissue.    A 1.4 x 1.4 x 1.3 cm hypoechoic intramural fibroid is seen within the lower uterine segment.    Right ovary: 2.8 x 1.6 x 1.4 cm. Within normal limits.  Left ovary: 2.4 x 1.7 x 1.5 cm. Within normal limits.    Fluid: Trace nonspecific fluid in the cul-de-sac present.    IMPRESSION:    Above findings concerning for retained products of conception.    1.4 cm intramural fibroid.    Findings discussed between Piter Dumont and Dr. Barron at 3:39 PM on 2021.    --- End of Report ---      PAST MEDICAL & SURGICAL HISTORY:  previous depression    History of D&amp;C  D&amp;E 21 15wks    History of   2017    FAMILY HISTORY:  grand parent had stroke      REVIEW OF SYSTEMS:  CONSTITUTIONAL: No weakness, +fevers or chills  EYES/ENT: No visual changes;  No vertigo or throat pain   NECK: No pain or stiffness  RESPIRATORY: No cough, wheezing, hemoptysis; No shortness of breath  CARDIOVASCULAR: No chest pain or palpitations  GASTROINTESTINAL: No abdominal or epigastric pain. No nausea, vomiting, or hematemesis; No diarrhea or constipation. No melena or hematochezia.  GENITOURINARY: No dysuria, frequency or hematuria  NEUROLOGICAL: No numbness or weakness  SKIN: No itching, burning, rashes, or lesions   All other review of systems is negative unless indicated above    Allergies  No Known Allergies        Antimicrobials:  ampicillin/sulbactam  IVPB      ampicillin/sulbactam  IVPB 3 Gram(s) IV Intermittent every 6 hours      Vital Signs Last 24 Hrs  T(C): 37.9 (2021 08:09), Max: 39.3 (2021 20:55)  T(F): 100.2 (2021 08:09), Max: 102.8 (2021 20:55)  HR: 102 (2021 08:09) (90 - 117)  BP: 102/- (2021 08:09) (102/- - 135/82)  BP(mean): --  RR: 18 (2021 08:09) (17 - 19)  SpO2: 96% (2021 08:09) (96% - 100%)    Physical Exam:   General: sitting  in bed, NAD, appears pale   HEENT: neck supple, full ROM  bright erythema on soft palate, unable to visualized pharynx, tongue elevated  Lungs: breathing comfortably on RA  Abd: Soft, non-tender, non-distended.   :  No bleeding on pad.    External labia wnl.  Bimanual exam with no cervical motion tenderness, uterus wnl, adnexa non palpable b/l.  Cervix closed  Speculum Exam: No active bleeding from os.  Physiologic discharge.    Ext: non-tender b/l, no edema                           10.1   9.54  )-----------( 176      ( 2021 07:52 )             29.7           138  |  103  |  10  ----------------------------<  102<H>  3.6   |  21<L>  |  0.77    Ca    9.1      2021 14:48    TPro  6.9  /  Alb  4.1  /  TBili  0.8  /  DBili  x   /  AST  15  /  ALT  14  /  AlkPhos  57          MICROBIOLOGY:  .Blood Blood-Peripheral  21   No growth to date.  --  --      .Surgical Swab PRODUCTS OF CONCEPTION  21   No growth  --  --      Clean Catch Clean Catch (Midstream)  21   >=3 organisms. Probable collection contamination.  --  --      .Blood Blood-Peripheral  21   No growth to date.  --  --      .Blood Blood-Peripheral  21   No growth to date.  --  --              Radiology:  < from: Xray Chest 1 View- PORTABLE-Urgent (21 @ 16:17) >  FINDINGS:  The heart is normal in size.  The lungs are clear.  There is no pneumothorax or pleural effusion.    IMPRESSION:  Clear lungs.    < end of copied text >      J Carlos Pemberton MD; Division of Infectious Disease; Pager: [phone]; nights and weekends: [phone]
SHAISTA ARREGUIN  36y  Female [id number]    HPI:        Name of GYN Physician:   OBHx:    GynHx: Denies fibroids, cysts, endometriosis, STI's, Abnormal pap smears   PMH:  PSH:  Meds:  Allx:  Social History:  Denies smoking use, drug use, alcohol use.   +occasional social alcohol use    Vital Signs Last 24 Hrs  T(C): 38 (2021 16:07), Max: 39.3 (2021 13:49)  T(F): 100.4 (2021 16:07), Max: 102.7 (2021 13:49)  HR: 99 (2021 16:07) (99 - 116)  BP: 108/69 (2021 16:07) (105/65 - 111/75)  BP(mean): 75 (2021 16:07) (75 - 75)  RR: 17 (2021 16:07) (17 - 20)  SpO2: 99% (2021 16:07) (98% - 100%)    Physical Exam:   General: sitting comfortably in bed, NAD   HEENT: neck supple, full ROM  CV: RRR  Lungs: CTA b/l, good air flow b/l   Back: No CVA tenderness  Abd: Soft, non-tender, non-distended.  Bowel sounds present.    :  No bleeding on pad.    External labia wnl.  Bimanual exam with no cervical motion tenderness, uterus wnl, adnexa non palpable b/l.  Cervix closed vs. Cervix dilated  Speculum Exam: No active bleeding from os.  Physiologic discharge.    Ext: non-tender b/l, no edema     LABS:                              12.2   15.60 )-----------( 265      ( 2021 14:48 )             34.8     11-    138  |  103  |  10  ----------------------------<  102<H>  3.6   |  21<L>  |  0.77    Ca    9.1      2021 14:48    TPro  6.9  /  Alb  4.1  /  TBili  0.8  /  DBili  x   /  AST  15  /  ALT  14  /  AlkPhos  57  11    I&O's Detail      Urinalysis Basic - ( 2021 14:52 )    Color: Yellow / Appearance: Clear / S.022 / pH: x  Gluc: x / Ketone: Negative  / Bili: Negative / Urobili: Negative   Blood: x / Protein: 30 mg/dL / Nitrite: Negative   Leuk Esterase: Small / RBC: 4 /hpf / WBC 5 /HPF   Sq Epi: x / Non Sq Epi: 1 /hpf / Bacteria: Negative        RADIOLOGY & ADDITIONAL STUDIES:

## 2021-11-04 NOTE — CONSULT NOTE ADULT - CONSULT REASON
Room #    Chief Complaint:  cough    HPI:    Myriam Haywood is a 74 y.o. female who presents today for c/o cough and nasal congestion    1. Have you been to the ER, urgent care clinic since your last visit? Hospitalized since your last visit? NO    2. Have you seen or consulted any other health care providers outside of the [address] [place] [address] since your last visit? Include any pap smears or colon screening. NO  When :  Reason:    Health Maintenance reviewed Yes    Health Maintenance Due   Topic Date Due    Shingrix Vaccine Age 49> (1 of 2) 07/07/1995    MEDICARE YEARLY EXAM  07/03/2019
fevers s/p D&C 10/27
fever

## 2021-11-04 NOTE — CONSULT NOTE ADULT - ASSESSMENT
36F  s/p D&C for 11wk MAB on 10/27 admitted via ED 21 with c/o fevers & chills. Pt underwent uncomplicated D&C on 10/27/21--discharged home without complaints. Pt states she began experiencing fevers & chills  which did not improve with motrin.   underwent D&C for removal of poc     Antibiotics  Clinda   Gent -->4  Amp -->3  UNASYN 11/3-->    Strep throat  continue Unasyn    s/p D&C with removal of POC   await path    Fever  will follow up cultures  leukocytosis resolved    with overall clinical improvement, I anticipate patient will defervesce  would continue Unasyn    discussed with OB attending

## 2021-11-05 ENCOUNTER — TRANSCRIPTION ENCOUNTER (OUTPATIENT)
Age: 36
End: 2021-11-05

## 2021-11-05 VITALS
DIASTOLIC BLOOD PRESSURE: 88 MMHG | SYSTOLIC BLOOD PRESSURE: 136 MMHG | HEART RATE: 94 BPM | OXYGEN SATURATION: 98 % | TEMPERATURE: 98 F | RESPIRATION RATE: 18 BRPM

## 2021-11-05 LAB
BASOPHILS # BLD AUTO: 0.02 K/UL — SIGNIFICANT CHANGE UP (ref 0–0.2)
BASOPHILS NFR BLD AUTO: 0.3 % — SIGNIFICANT CHANGE UP (ref 0–2)
EOSINOPHIL # BLD AUTO: 0.17 K/UL — SIGNIFICANT CHANGE UP (ref 0–0.5)
EOSINOPHIL NFR BLD AUTO: 2.4 % — SIGNIFICANT CHANGE UP (ref 0–6)
HCT VFR BLD CALC: 29.3 % — LOW (ref 34.5–45)
HGB BLD-MCNC: 10.3 G/DL — LOW (ref 11.5–15.5)
IMM GRANULOCYTES NFR BLD AUTO: 0.7 % — SIGNIFICANT CHANGE UP (ref 0–1.5)
LYMPHOCYTES # BLD AUTO: 1.56 K/UL — SIGNIFICANT CHANGE UP (ref 1–3.3)
LYMPHOCYTES # BLD AUTO: 21.8 % — SIGNIFICANT CHANGE UP (ref 13–44)
MCHC RBC-ENTMCNC: 31.9 PG — SIGNIFICANT CHANGE UP (ref 27–34)
MCHC RBC-ENTMCNC: 35.2 GM/DL — SIGNIFICANT CHANGE UP (ref 32–36)
MCV RBC AUTO: 90.7 FL — SIGNIFICANT CHANGE UP (ref 80–100)
MONOCYTES # BLD AUTO: 0.59 K/UL — SIGNIFICANT CHANGE UP (ref 0–0.9)
MONOCYTES NFR BLD AUTO: 8.3 % — SIGNIFICANT CHANGE UP (ref 2–14)
NEUTROPHILS # BLD AUTO: 4.76 K/UL — SIGNIFICANT CHANGE UP (ref 1.8–7.4)
NEUTROPHILS NFR BLD AUTO: 66.5 % — SIGNIFICANT CHANGE UP (ref 43–77)
NRBC # BLD: 0 /100 WBCS — SIGNIFICANT CHANGE UP (ref 0–0)
PLATELET # BLD AUTO: 211 K/UL — SIGNIFICANT CHANGE UP (ref 150–400)
RBC # BLD: 3.23 M/UL — LOW (ref 3.8–5.2)
RBC # FLD: 12.7 % — SIGNIFICANT CHANGE UP (ref 10.3–14.5)
WBC # BLD: 7.15 K/UL — SIGNIFICANT CHANGE UP (ref 3.8–10.5)
WBC # FLD AUTO: 7.15 K/UL — SIGNIFICANT CHANGE UP (ref 3.8–10.5)

## 2021-11-05 PROCEDURE — 88305 TISSUE EXAM BY PATHOLOGIST: CPT

## 2021-11-05 PROCEDURE — 87880 STREP A ASSAY W/OPTIC: CPT

## 2021-11-05 PROCEDURE — C9399: CPT

## 2021-11-05 PROCEDURE — 71045 X-RAY EXAM CHEST 1 VIEW: CPT

## 2021-11-05 PROCEDURE — 84702 CHORIONIC GONADOTROPIN TEST: CPT

## 2021-11-05 PROCEDURE — 76817 TRANSVAGINAL US OBSTETRIC: CPT

## 2021-11-05 PROCEDURE — 85025 COMPLETE CBC W/AUTO DIFF WBC: CPT

## 2021-11-05 PROCEDURE — 82565 ASSAY OF CREATININE: CPT

## 2021-11-05 PROCEDURE — 84132 ASSAY OF SERUM POTASSIUM: CPT

## 2021-11-05 PROCEDURE — 87077 CULTURE AEROBIC IDENTIFY: CPT

## 2021-11-05 PROCEDURE — 93975 VASCULAR STUDY: CPT

## 2021-11-05 PROCEDURE — 86901 BLOOD TYPING SEROLOGIC RH(D): CPT

## 2021-11-05 PROCEDURE — 36000 PLACE NEEDLE IN VEIN: CPT

## 2021-11-05 PROCEDURE — 87040 BLOOD CULTURE FOR BACTERIA: CPT

## 2021-11-05 PROCEDURE — 85018 HEMOGLOBIN: CPT

## 2021-11-05 PROCEDURE — 80053 COMPREHEN METABOLIC PANEL: CPT

## 2021-11-05 PROCEDURE — 0225U NFCT DS DNA&RNA 21 SARSCOV2: CPT

## 2021-11-05 PROCEDURE — 81001 URINALYSIS AUTO W/SCOPE: CPT

## 2021-11-05 PROCEDURE — 86769 SARS-COV-2 COVID-19 ANTIBODY: CPT

## 2021-11-05 PROCEDURE — 84295 ASSAY OF SERUM SODIUM: CPT

## 2021-11-05 PROCEDURE — 82803 BLOOD GASES ANY COMBINATION: CPT

## 2021-11-05 PROCEDURE — U0003: CPT

## 2021-11-05 PROCEDURE — 82947 ASSAY GLUCOSE BLOOD QUANT: CPT

## 2021-11-05 PROCEDURE — 82435 ASSAY OF BLOOD CHLORIDE: CPT

## 2021-11-05 PROCEDURE — 83605 ASSAY OF LACTIC ACID: CPT

## 2021-11-05 PROCEDURE — 85014 HEMATOCRIT: CPT

## 2021-11-05 PROCEDURE — 36415 COLL VENOUS BLD VENIPUNCTURE: CPT

## 2021-11-05 PROCEDURE — 87070 CULTURE OTHR SPECIMN AEROBIC: CPT

## 2021-11-05 PROCEDURE — 99285 EMERGENCY DEPT VISIT HI MDM: CPT | Mod: 25

## 2021-11-05 PROCEDURE — 86850 RBC ANTIBODY SCREEN: CPT

## 2021-11-05 PROCEDURE — 87086 URINE CULTURE/COLONY COUNT: CPT

## 2021-11-05 PROCEDURE — 93005 ELECTROCARDIOGRAM TRACING: CPT

## 2021-11-05 PROCEDURE — 82330 ASSAY OF CALCIUM: CPT

## 2021-11-05 PROCEDURE — 86900 BLOOD TYPING SEROLOGIC ABO: CPT

## 2021-11-05 RX ORDER — AMOXICILLIN 250 MG/5ML
1 SUSPENSION, RECONSTITUTED, ORAL (ML) ORAL
Qty: 21 | Refills: 0
Start: 2021-11-05 | End: 2021-11-11

## 2021-11-05 RX ADMIN — HEPARIN SODIUM 5000 UNIT(S): 5000 INJECTION INTRAVENOUS; SUBCUTANEOUS at 09:14

## 2021-11-05 RX ADMIN — AMPICILLIN SODIUM AND SULBACTAM SODIUM 200 GRAM(S): 250; 125 INJECTION, POWDER, FOR SUSPENSION INTRAMUSCULAR; INTRAVENOUS at 04:42

## 2021-11-05 RX ADMIN — AMPICILLIN SODIUM AND SULBACTAM SODIUM 200 GRAM(S): 250; 125 INJECTION, POWDER, FOR SUSPENSION INTRAMUSCULAR; INTRAVENOUS at 09:14

## 2021-11-05 RX ADMIN — SODIUM CHLORIDE 50 MILLILITER(S): 9 INJECTION, SOLUTION INTRAVENOUS at 04:42

## 2021-11-05 NOTE — DISCHARGE NOTE PROVIDER - CARE PROVIDER_API CALL
Peggy De La Vega)  Obstetrics and Gynecology  [address] [facility], [address] [place] [address]  Phone: [phone]  Fax: [phone]  Follow Up Time:

## 2021-11-05 NOTE — DISCHARGE NOTE NURSING/CASE MANAGEMENT/SOCIAL WORK - PATIENT PORTAL LINK FT
You can access the FollowMyHealth Patient Portal offered by [facility] by registering at the following website: [email or web address]. By joining [facility]’s FollowMyHealth portal, you will also be able to view your health information using other applications (apps) compatible with our system.

## 2021-11-05 NOTE — PROGRESS NOTE ADULT - SUBJECTIVE AND OBJECTIVE BOX
Gyn Resident Progress Note     Patient seen and examined at bedside, no acute overnight events. Pt afebrile overnight. Pt feels well, w/ improvement of sore throat. Patient is ambulating, tolerating diet, voiding, & passing flatus. Denies CP, SOB, N/V, fevers, and chills.    Vital Signs Last 24 Hours  T(C): 37 (11-05-21 @ 04:48), Max: 38.2 (11-04-21 @ 17:00)  HR: 94 (11-05-21 @ 04:48) (84 - 102)  BP: 122/83 (11-05-21 @ 04:48) (102/74 - 125/82)  RR: 17 (11-05-21 @ 04:48) (17 - 18)  SpO2: 96% (11-05-21 @ 04:48) (96% - 98%)    I&O's Summary    03 Nov 2021 07:01  -  04 Nov 2021 07:00  --------------------------------------------------------  IN: 800 mL / OUT: 0 mL / NET: 800 mL    04 Nov 2021 07:01  -  05 Nov 2021 06:24  --------------------------------------------------------  IN: 1200 mL / OUT: 0 mL / NET: 1200 mL    Physical Exam:  General: NAD  CV: RRR  Lungs: CTA-B  Abdomen: Soft, non-tender, non-distended, +BS  Ext: No pain or swelling    Labs:             10.1<L>  9.54  )-----------( 176      ( 11-04 @ 07:52 )             29.7<L>               9.2<L>  10.33 )-----------( 159      ( 11-03 @ 22:15 )             27.2<L>               10.8<L>  12.79<H> )-----------( 185      ( 11-03 @ 06:55 )             31.8<L>               12.2   15.60<H> )-----------( 265      ( 11-02 @ 14:48 )             34.8         MEDICATIONS  (STANDING):  ampicillin/sulbactam  IVPB      ampicillin/sulbactam  IVPB 3 Gram(s) IV Intermittent every 6 hours  heparin   Injectable 5000 Unit(s) SubCutaneous every 12 hours  ibuprofen  Tablet. 600 milliGRAM(s) Oral every 6 hours  lactated ringers. 1000 milliLiter(s) (50 mL/Hr) IV Continuous <Continuous>    MEDICATIONS  (PRN):  acetaminophen     Tablet .. 975 milliGRAM(s) Oral every 6 hours PRN Temp greater or equal to 38C (100.4F), Mild Pain (1 - 3)  benzocaine 15 mG/menthol 3.6 mG (Sugar-Free) Lozenge 1 Lozenge Oral five times a day PRN Sore Throat  benzocaine 15 mG/menthol 3.6 mG (Sugar-Free) Lozenge 1 Lozenge Oral daily PRN Sore Throat

## 2021-11-05 NOTE — DISCHARGE NOTE PROVIDER - HOSPITAL COURSE
36y admitted with sepsis on POD#6 from an DVC for MAB, concern for retained POC on sono, also with +rapid strep. Underwent uncomplicated DVC. Multiple post-op fevers, patient transitioned to unasyn per ID recommendations. ID suspected pharyngitis rather than septic . Patient now afebrile x24 hours, meeting all discharge criteria.

## 2021-11-05 NOTE — PROGRESS NOTE ADULT - ASSESSMENT
36y admitted with sepsis on POD#6 from an DVC for MAB, concern for retained POC on sono, also with +rapid strep. Now POD#3 s/p uncomplicated DVC. Patient on unasyn per ID recommendations. Patient afebrile overnight. Patient stable and doing well clinically.      Neuro: PO Tylenol and Motrin PRN, Cepacol drops PRN  CV: Hemodynamically stable  Pulm: Saturating well on room air, encourage OOB  GI: Continue regular diet   : voiding spontaneously   Heme: HSQ, SCDs, ambulation for DVT prophylaxis   FEN: LR@50. Replete electrolytes prn   ID: a/w sepsis s/p DVC for possible septic ab in the setting of retained POC on sono, also w/ +rapid strep. Afebrile overnight. Currently on Unasyn per ID recs, s/p Amp/Gent/Clinda (11/2-11/4). Otherwise clinically stable. Leukocytosis resolved. Ucx (11/2), Bcx (11/2, 11/3, 11/4), POC cx (11/2) pending. Appreciate ID recs  Endo: No active issues   Dispo: Continue inpatient care at this time pending ID recs    Elke Horowitz,PGY2

## 2021-11-05 NOTE — DISCHARGE NOTE NURSING/CASE MANAGEMENT/SOCIAL WORK - NSDCPNINST_GEN_ALL_CORE
Follow up with MD as directed call if any increased pain, fever, chills, pain on urination, vaginal bleeding. Continue to take antibiotics as directed and follow up with MD next week.

## 2021-11-05 NOTE — DISCHARGE NOTE PROVIDER - NSDCMRMEDTOKEN_GEN_ALL_CORE_FT
amoxicillin 500 mg oral tablet: 1 tab(s) orally 3 times a day   Motrin 600 mg oral tablet: 1 tab(s) orally every 6 hours, As Needed  Prena1 oral capsule: 1 cap(s) orally once a day  Tylenol 500 mg oral tablet: 2 tab(s) orally every 6 hours, As Needed

## 2021-11-05 NOTE — DISCHARGE NOTE PROVIDER - NSDCFUADDAPPT_GEN_ALL_CORE_FT
Follow up with Dr. Wilde's office in the next week.    Regular diet as tolerated, regular activity as tolerated.    Nothing per vagina: no intercourse, tampons or douching.  No submerging.     Call your provider if you experience fevers, chills, worsening abdominal pain, inability to urinate or heavy vaginal bleeding.

## 2021-11-07 LAB
CULTURE RESULTS: SIGNIFICANT CHANGE UP
CULTURE RESULTS: SIGNIFICANT CHANGE UP
SPECIMEN SOURCE: SIGNIFICANT CHANGE UP
SPECIMEN SOURCE: SIGNIFICANT CHANGE UP

## 2021-11-08 ENCOUNTER — NON-APPOINTMENT (OUTPATIENT)
Age: 36
End: 2021-11-08

## 2021-11-09 LAB
CULTURE RESULTS: SIGNIFICANT CHANGE UP
CULTURE RESULTS: SIGNIFICANT CHANGE UP
SPECIMEN SOURCE: SIGNIFICANT CHANGE UP
SPECIMEN SOURCE: SIGNIFICANT CHANGE UP
SURGICAL PATHOLOGY STUDY: SIGNIFICANT CHANGE UP

## 2021-11-10 NOTE — CHART NOTE - NSCHARTNOTEFT_GEN_A_CORE
R2 Chart Note     Patient was seen and examined at bedside after nurse notified that patient is having recurrent fevers . Patient states she was having chills prior to fever, none currently but overall feels well. Denies headaches, chest pain, palpitations, abdominal pain, nausea, vomiting.       NAD, sleeping comfortably in bed , appears well   VS: 102.3. hr:107, 109/69, RR18, 98%RA ( vitals 15 minutes prior to examination)  CV:RRR  Resp: CTA b/l   Abd: Nontender , Soft   : Minimal bleeding seen on pad     36 year old s/p D&C for retained products with recurrent fevers despite triple antibiotics and antipyretics    -1L Bolus  -Continue on antibiotics ( Amp, Gent, Clinda)  -For Tylenol 975  -Repeat Blood Cultures   -Monitor vital signs       d/w Dr. Andrae Greene, PGY2
R4 Chart Note    Spoke with Dr. J Carlos Pemberton of ID. Given patient's clinical improvement reasonable to discharge home today with PO Amoxicillin 500mg TID x7 days.    EFRAIN Dukes PGY4
Spoke to patient regarding results of blood cultures from 11/2 and 11/3. Pt aware that final results were negative for any growth. Pt has been feeling well since discharge, no fevers, pelvic pain, or heavy bleeding. Pt had f/u apt with gyn today. Return precautions given. All questions answered. Will remove patient from f/u list     KARTHIK Romero, PGY-1
37yo P1 s/p suction D&C for missed ab 11wk 10/27, uncomplicated procedure, now admitted with fevers/chills presumed septic  due to retained products,  now s/p D&C . On triple abx IV with improvement in fevers. Not 24hrs afebrile yet.   Also found to have Grp A Strep on throat swab. Neg covid. Neg blood cultures.   Pt feeling improved but with sore throat. WBC improving. advised okay for benadryl or throat lozenge/spray for symptom help.   discussed diagnosis, procedure, and likely course of treatment. All questions and concerns addressed with pt.   Kingsley BLACKBURN

## 2021-11-16 LAB — CHROM ANALY OVERALL INTERP SPEC-IMP: SIGNIFICANT CHANGE UP

## 2021-11-30 LAB — PRENATAL GENOME CHROMO MICROARRAY: NEGATIVE — SIGNIFICANT CHANGE UP

## 2022-01-10 ENCOUNTER — TRANSCRIPTION ENCOUNTER (OUTPATIENT)
Age: 37
End: 2022-01-10

## 2022-01-12 ENCOUNTER — APPOINTMENT (OUTPATIENT)
Dept: INTERNAL MEDICINE | Facility: CLINIC | Age: 37
End: 2022-01-12
Payer: COMMERCIAL

## 2022-01-12 DIAGNOSIS — R09.82 POSTNASAL DRIP: ICD-10-CM

## 2022-01-12 DIAGNOSIS — U07.1 COVID-19: ICD-10-CM

## 2022-01-12 PROCEDURE — 99213 OFFICE O/P EST LOW 20 MIN: CPT | Mod: 95

## 2022-01-12 NOTE — PHYSICAL EXAM
[No Acute Distress] : no acute distress [Well-Appearing] : well-appearing [No Respiratory Distress] : no respiratory distress  [No Accessory Muscle Use] : no accessory muscle use

## 2022-01-12 NOTE — HISTORY OF PRESENT ILLNESS
[Home] : at home, [unfilled] , at the time of the visit. [Medical Office: ([facility])___] : at the medical office located in  [Verbal consent obtained from patient] : the patient, [unfilled] [FreeTextEntry8] : Tested positive for COVID 12/28\par Sx started 12/23/21 - by her report, she had a fairly mild course. Got sick just a few days before her booster was scheduled, so she had to postpone it. Was having some low grade fever and dry cough still over this past weekend but now those sx have resolved. \par Really feels back to baseline except for a constant need to clear her throat, and feeling of secretions pooling. Cough almost completely gone.

## 2022-01-12 NOTE — ASSESSMENT
[FreeTextEntry1] : Tested positive for COVID 12/28\par Sx started 12/23/21 - by her report, she had a fairly mild course. Got sick just a few days before her booster was scheduled, so she had to postpone it. Was having some low grade fever and dry cough still over this past weekend but now those sx have resolved. \par Really feels back to baseline except for a constant need to clear her throat, and feeling of secretions pooling. Cough almost completely gone. \par \par I/P COVID 19 infection - now in recovery phase. Only remaining sx seems to be post viral  PND  No evid for any active infection. \par Atrovent nasal spray PRN.\par Patient advised to call for any worsening or if no resolution.

## 2022-06-01 ENCOUNTER — RESULT REVIEW (OUTPATIENT)
Age: 37
End: 2022-06-01

## 2023-06-26 ENCOUNTER — OUTPATIENT (OUTPATIENT)
Dept: OUTPATIENT SERVICES | Facility: HOSPITAL | Age: 38
LOS: 1 days | End: 2023-06-26
Payer: COMMERCIAL

## 2023-06-26 VITALS
RESPIRATION RATE: 16 BRPM | TEMPERATURE: 98 F | DIASTOLIC BLOOD PRESSURE: 72 MMHG | HEART RATE: 87 BPM | WEIGHT: 182.54 LBS | SYSTOLIC BLOOD PRESSURE: 118 MMHG | OXYGEN SATURATION: 99 % | HEIGHT: 65 IN

## 2023-06-26 DIAGNOSIS — Z98.890 OTHER SPECIFIED POSTPROCEDURAL STATES: Chronic | ICD-10-CM

## 2023-06-26 DIAGNOSIS — O34.211 MATERNAL CARE FOR LOW TRANSVERSE SCAR FROM PREVIOUS CESAREAN DELIVERY: ICD-10-CM

## 2023-06-26 DIAGNOSIS — Z98.891 HISTORY OF UTERINE SCAR FROM PREVIOUS SURGERY: ICD-10-CM

## 2023-06-26 DIAGNOSIS — O02.1 MISSED ABORTION: Chronic | ICD-10-CM

## 2023-06-26 DIAGNOSIS — Z01.818 ENCOUNTER FOR OTHER PREPROCEDURAL EXAMINATION: ICD-10-CM

## 2023-06-26 DIAGNOSIS — Z98.891 HISTORY OF UTERINE SCAR FROM PREVIOUS SURGERY: Chronic | ICD-10-CM

## 2023-06-26 LAB
ANION GAP SERPL CALC-SCNC: 13 MMOL/L — SIGNIFICANT CHANGE UP (ref 5–17)
BUN SERPL-MCNC: 8 MG/DL — SIGNIFICANT CHANGE UP (ref 7–23)
CALCIUM SERPL-MCNC: 9.2 MG/DL — SIGNIFICANT CHANGE UP (ref 8.4–10.5)
CHLORIDE SERPL-SCNC: 105 MMOL/L — SIGNIFICANT CHANGE UP (ref 96–108)
CO2 SERPL-SCNC: 21 MMOL/L — LOW (ref 22–31)
CREAT SERPL-MCNC: 0.65 MG/DL — SIGNIFICANT CHANGE UP (ref 0.5–1.3)
EGFR: 114 ML/MIN/1.73M2 — SIGNIFICANT CHANGE UP
GLUCOSE SERPL-MCNC: 69 MG/DL — LOW (ref 70–99)
HCT VFR BLD CALC: 34.6 % — SIGNIFICANT CHANGE UP (ref 34.5–45)
HGB BLD-MCNC: 11.8 G/DL — SIGNIFICANT CHANGE UP (ref 11.5–15.5)
MCHC RBC-ENTMCNC: 31.1 PG — SIGNIFICANT CHANGE UP (ref 27–34)
MCHC RBC-ENTMCNC: 34.1 GM/DL — SIGNIFICANT CHANGE UP (ref 32–36)
MCV RBC AUTO: 91.1 FL — SIGNIFICANT CHANGE UP (ref 80–100)
NRBC # BLD: 0 /100 WBCS — SIGNIFICANT CHANGE UP (ref 0–0)
PLATELET # BLD AUTO: 304 K/UL — SIGNIFICANT CHANGE UP (ref 150–400)
POTASSIUM SERPL-MCNC: 4 MMOL/L — SIGNIFICANT CHANGE UP (ref 3.5–5.3)
POTASSIUM SERPL-SCNC: 4 MMOL/L — SIGNIFICANT CHANGE UP (ref 3.5–5.3)
RBC # BLD: 3.8 M/UL — SIGNIFICANT CHANGE UP (ref 3.8–5.2)
RBC # FLD: 13.8 % — SIGNIFICANT CHANGE UP (ref 10.3–14.5)
SODIUM SERPL-SCNC: 139 MMOL/L — SIGNIFICANT CHANGE UP (ref 135–145)
WBC # BLD: 10.11 K/UL — SIGNIFICANT CHANGE UP (ref 3.8–10.5)
WBC # FLD AUTO: 10.11 K/UL — SIGNIFICANT CHANGE UP (ref 3.8–10.5)

## 2023-06-26 PROCEDURE — G0463: CPT

## 2023-06-26 PROCEDURE — 86900 BLOOD TYPING SEROLOGIC ABO: CPT

## 2023-06-26 PROCEDURE — 86901 BLOOD TYPING SEROLOGIC RH(D): CPT

## 2023-06-26 PROCEDURE — 86850 RBC ANTIBODY SCREEN: CPT

## 2023-06-26 RX ORDER — OXYTOCIN 10 UNIT/ML
333.33 VIAL (ML) INJECTION
Qty: 20 | Refills: 0 | Status: DISCONTINUED | OUTPATIENT
Start: 2023-07-12 | End: 2023-07-14

## 2023-06-26 RX ORDER — CEFAZOLIN SODIUM 1 G
2000 VIAL (EA) INJECTION ONCE
Refills: 0 | Status: DISCONTINUED | OUTPATIENT
Start: 2023-07-12 | End: 2023-07-14

## 2023-06-26 RX ORDER — IBUPROFEN 200 MG
1 TABLET ORAL
Qty: 0 | Refills: 0 | DISCHARGE

## 2023-06-26 RX ORDER — SODIUM CHLORIDE 9 MG/ML
1000 INJECTION, SOLUTION INTRAVENOUS
Refills: 0 | Status: DISCONTINUED | OUTPATIENT
Start: 2023-07-12 | End: 2023-07-12

## 2023-06-26 RX ORDER — ACETAMINOPHEN 500 MG
2 TABLET ORAL
Qty: 0 | Refills: 0 | DISCHARGE

## 2023-06-26 NOTE — OB PST NOTE - NSHPREVIEWOFSYSTEMS_GEN_ALL_CORE
REVIEW OF SYSTEMS:    CONSTITUTIONAL:   No weakness, fever or chills  EYES/ENT:        No visual changes;  No vertigo or throat pain   NECK:                No pain or stiffness  RESPIRATORY:   No cough, wheezing, hemoptysis; No shortness of breath  CARDIOVASCULAR:   No chest pain or palpitations  GASTROINTESTINAL:  No abdominal or epigastric pain. No nausea, vomiting, or hematemesis; No diarrhea or constipation. No melena or hematochezia.  GENITOURINARY:  Fetal movements +, denies painful contractions or abnormal vaginal discharge, no mucous/ blood stained vaginal discharge, no leaking of fluid or painful urination.  NEUROLOGICAL:  No head ache,  numbness or weakness  MUSCULOSKELETAL :No Pedal edema, no pain or limitation of activities, denies muscle cramps/no calf pain  SKIN:  No itching, rashes  Psych : Ds anxiety or depression. denies h/o hallucinations .

## 2023-06-26 NOTE — OB PST NOTE - ACTIVITY
Subjective:     Patient ID: Harrison Russell is a 57 y.o. male.    Chief Complaint: Pain and Injury of the Left Hip  9/24/20  HPI:  57-year-old white male who stated got slightly dizzy and lost his occur brim and fell backwards on Saturday and then presents to emergency room on Sunday 09/20/2020.  Evaluation of his spine knee is as well as his hips was performed.  Findings of a nondisplaced left greater trochanteric fracture.  He uses a walker to get around any was given Percocet that is giving him itching.  He cannot take NSAIDs due to do numerous cardiac issues.  He is seeing Cardiology at this point which they are looking to obtain a stress test as well as echo/.Mirna  Have history of bilateral total hip replacement in 1999 and 2004 AVN in [place] Dr. Zurdo Jefferson.  Since then he had moved into this area and had not had problems with his hips.  No evaluation of his hips since then.  He is having some pain in the left and right knee and is having bruising.  Denies any fever or chills or shortness of breath since he is not moving too much she states, no chest pain no fever no chills no loss of sense of taste or smell no blurry vision or double vision or loss of bowel bladder control  Past Medical History:   Diagnosis Date    Arthritis     back     COPD (chronic obstructive pulmonary disease)     Diabetes mellitus     Diabetes mellitus, type 2     Hypertension      Past Surgical History:   Procedure Laterality Date    BACK SURGERY      cyst removal from lower spine    EXTRACTION OF TOOTH      HERNIA REPAIR Right     inguinal    JOINT REPLACEMENT Bilateral     hip    ROTATOR CUFF REPAIR Right 10/2019    Surgical specialty Dr. CORINA Dominique    rotator cup  10/09/2019    THORACOSCOPIC WEDGE RESECTION OF LUNG Right 12/4/2018    Procedure: VATS, WITH WEDGE RESECTION, LUNG;  Surgeon: Saman Dooley MD;  Location: [facility] OR;  Service: Thoracic;  Laterality: Right;    TONSILLECTOMY       Family History    Problem Relation Age of Onset    Cancer Mother     Diabetes Mother     Hypertension Mother     Hearing loss Father     Heart disease Father     Hypertension Father     Drug abuse Brother     Hypertension Brother     Kidney disease Son     COPD Paternal Aunt     Heart disease Paternal Grandfather      Social History     Socioeconomic History    Marital status:      Spouse name: Not on file    Number of children: Not on file    Years of education: Not on file    Highest education level: Not on file   Occupational History    Not on file   Social Needs    Financial resource strain: Not hard at all    Food insecurity     Worry: Never true     Inability: Never true    Transportation needs     Medical: No     Non-medical: No   Tobacco Use    Smoking status: Current Every Day Smoker     Packs/day: 1.50     Years: 40.00     Pack years: 60.00     Types: Cigarettes     Last attempt to quit: 2018     Years since quittin.8    Smokeless tobacco: Never Used   Substance and Sexual Activity    Alcohol use: Yes     Alcohol/week: 4.0 standard drinks     Types: 4 Shots of liquor per week     Frequency: 4 or more times a week     Drinks per session: 1 or 2     Binge frequency: Less than monthly     Comment:  no alcohol 72h prior to sx    Drug use: No    Sexual activity: Yes     Partners: Female   Lifestyle    Physical activity     Days per week: 0 days     Minutes per session: 0 min    Stress: To some extent   Relationships    Social connections     Talks on phone: More than three times a week     Gets together: Once a week     Attends Yazidi service: Not on file     Active member of club or organization: No     Attends meetings of clubs or organizations: Never     Relationship status:    Other Topics Concern    Not on file   Social History Narrative    Not on file     Medication List with Changes/Refills   New Medications    HYDROCODONE-ACETAMINOPHEN (NORCO)  MG PER TABLET     Take 1 tablet by mouth every 6 (six) hours as needed for Pain.   Current Medications    ASPIRIN (ECOTRIN) 81 MG EC TABLET    Take 81 mg by mouth once daily.    EMPAGLIFLOZIN (JARDIANCE) 25 MG TAB    Take 1 tablet by mouth once daily.    ESCITALOPRAM OXALATE (LEXAPRO) 20 MG TABLET    TK 1 T PO ONCE D    LISINOPRIL 10 MG TABLET    Take 1 tablet (10 mg total) by mouth once daily.    METFORMIN (FORTAMET) 1,000 MG 24HR TABLET    TAKE 1 TABLET EVERY EVENING    METOPROLOL TARTRATE (LOPRESSOR) 25 MG TABLET    TAKE 1 TABLET TWICE A DAY    OMEPRAZOLE (PRILOSEC OTC) 20 MG TABLET    Take 20 mg by mouth once daily.    OXYCODONE-ACETAMINOPHEN (PERCOCET)  MG PER TABLET    Take 1 tablet by mouth every 6 (six) hours as needed for Pain.    SILDENAFIL (VIAGRA) 100 MG TABLET    TAKE 1 TABLET AS NEEDED FOR ERECTILE DYSFUNCTION    SIMVASTATIN (ZOCOR) 20 MG TABLET    Take 1 tablet (20 mg total) by mouth every evening.    TESTOSTERONE CYPIONATE (DEPOTESTOTERONE CYPIONATE) 200 MG/ML INJECTION    Inject 1 mL (200 mg total) into the muscle every 21 days.    TRELEGY ELLIPTA 100-62.5-25 MCG DSDV    USE 1 INHALATION DAILY    TRULICITY 1.5 MG/0.5 ML PEN INJECTOR    INJECT 1.5 MG UNDER THE SKIN EVERY 7 DAYS    VENTOLIN HFA 90 MCG/ACTUATION INHALER    USE 2 INHALATIONS EVERY 4 HOURS AS NEEDED FOR WHEEZING     Review of patient's allergies indicates:   Allergen Reactions    Percocet [oxycodone-acetaminophen] Itching    Lortab [hydrocodone-acetaminophen] Itching     Review of Systems   Constitution: Negative for decreased appetite.   HENT: Negative for tinnitus.    Eyes: Negative for double vision.   Cardiovascular: Negative for chest pain.   Respiratory: Negative for wheezing.    Hematologic/Lymphatic: Negative for bleeding problem.   Skin: Positive for itching. Negative for dry skin.   Musculoskeletal: Positive for back pain, joint pain and joint swelling. Negative for arthritis, gout, neck pain and stiffness.   Gastrointestinal: Negative  for abdominal pain.   Genitourinary: Negative for bladder incontinence.   Neurological: Negative for light-headedness, numbness, paresthesias and sensory change.   Psychiatric/Behavioral: Negative for altered mental status.       Objective:   Body mass index is 33.12 kg/m².  Vitals:    09/24/20 1014   BP: 103/69   Pulse: 85          General    Constitutional: He is oriented to person, place, and time. He appears well-developed.   HENT:   Head: Atraumatic.   Eyes: EOM are normal.   Cardiovascular: Normal rate.    Pulmonary/Chest: Effort normal.   Neurological: He is alert and oriented to person, place, and time.   Psychiatric: Judgment normal.            Patient in a wheelchair.  He has a walker at home.  Bilateral upper extremity neurovascularly intact.  Radial and ulnar pulses 2+.  Strength is 5/5 throughout motor groups  Lumbar with mild tenderness between L3 and L5 level.  No deformity seen.  Negative straight leg raising bilaterally.  Pelvis is level  Any attempt to palpation over the left greater trochanter seems to be very painful.  Not so much painful on the right side.  Any attempt on internal external rotation on the left seems to be painful.  Not painful to the right hip.  Left hip flexors are 4/5 as well as abductors.  Right hip flexors, abductors, adductors, quads, hamstrings, ankle extensors and flexors all 5/5  Left leg quads and hamstrings are slightly weak at 4/5.  Right knee mild medial joint tenderness.  No ecchymosis.  Collaterals and cruciates are stable.  Almost normal range of motion with mild swelling.  Left knee the same  Calves are soft  Could not palpate pulses  Skin with ecchymosis over the hip.  There is some skin changes over the dorsum of the hand on the left.  No obvious lesions seen    Relevant imaging results reviewed and interpreted by me, discussed with the patient and / or family today     X-Ray Lumbar Spine Ap And Lateral  Narrative: EXAMINATION:  XR LUMBAR SPINE AP AND  LATERAL    CLINICAL HISTORY:  Back pain or radiculopathy, trauma;    TECHNIQUE:  AP, lateral and spot images were performed of the lumbar spine.    COMPARISON:  None    FINDINGS:  No fracture or dislocation of the lumbar spine.  No anterolisthesis.  No significant degenerative change.  Calcified gallstones are noted.  Impression: See above    Electronically signed by: Joe Deluca MD  Date:    09/20/2020  Time:    13:11  X-Ray Femur Ap/Lat Left  Narrative: EXAMINATION:  XR FEMUR 2 VIEW LEFT    CLINICAL HISTORY:  Unspecified fall, initial encounter    TECHNIQUE:  AP and lateral views of the left femur were performed.    COMPARISON:  None}    FINDINGS:  Multiple views of the left femur show a total left hip arthroplasty.  There appears to be an acute fracture of the greater trochanter with this continuity of bone along the lateral cortex and some irregular appearance of the superior aspect of the greater trochanter.  Impression: Suspect acute greater trochanteric fracture, nondisplaced, in this patient with a left hip arthroplasty.    Electronically signed by: Joe Deluca MD  Date:    09/20/2020  Time:    12:01  X-Ray Knee Complete 4 Or More Views Right  Narrative: EXAMINATION:  XR KNEE COMP 4 OR MORE VIEWS RIGHT    CLINICAL HISTORY:  Pain in right knee    COMPARISON:  None    FINDINGS:  Four views of the right knee show no fracture or dislocation.  No joint effusion  Impression: Normal right knee x-ray.    Electronically signed by: Joe Deluca MD  Date:    09/20/2020  Time:    11:56  X-Ray Pelvis Routine AP  Narrative: EXAMINATION:  XR PELVIS ROUTINE AP    CLINICAL HISTORY:  pain;  Pain in left hip    TECHNIQUE:  AP view of the pelvis was performed.    COMPARISON:  None.    FINDINGS:  There are bilateral hip arthroplasties.  No hip dislocation.  No pelvic fracture.  Impression: See above    Electronically signed by: Joe Deluca MD  Date:    09/20/2020  Time:    11:55      Assessment:     Encounter Diagnoses   Name  Primary?    Closed displaced fracture of greater trochanter of left femur, initial encounter Yes    Acute bilateral knee pain         Plan:   Closed displaced fracture of greater trochanter of left femur, initial encounter  -     HYDROcodone-acetaminophen (NORCO)  mg per tablet; Take 1 tablet by mouth every 6 (six) hours as needed for Pain.  Dispense: 28 tablet; Refill: 0    Acute bilateral knee pain       Patient Instructions   Left greater trochanteric fracture with minimal displacement.  There is no muscle attachment at that site so there is no surgical treatment needed  May use Aspercreme with lidocaine apply 3 in 3 times a day and or use lidocaine patches  Weight bear as tolerated using the walker  May use ice  Prescription for Norco 10 mg Q 6 p.r.n. pain.  May have to make it last for 2 weeks  May use Benadryl 25 mg if you have any itching  Return to clinic in 2 weeks with x-ray of the left hip            Disclaimer: This note was prepared using a voice recognition system and is likely to have sound alike errors within the text.      walks, works, home chores

## 2023-06-26 NOTE — OB PST NOTE - PROBLEM SELECTOR PLAN 1
38 year old female LMP 10/11/2022  with h/o  x1 (2017), presents for scheduled repeat   on 23.  preop cbc, bmp t/s sent

## 2023-07-11 ENCOUNTER — TRANSCRIPTION ENCOUNTER (OUTPATIENT)
Age: 38
End: 2023-07-11

## 2023-07-12 ENCOUNTER — INPATIENT (INPATIENT)
Facility: HOSPITAL | Age: 38
LOS: 1 days | Discharge: HOME | End: 2023-07-14
Attending: OBSTETRICS & GYNECOLOGY | Admitting: OBSTETRICS & GYNECOLOGY
Payer: COMMERCIAL

## 2023-07-12 VITALS — DIASTOLIC BLOOD PRESSURE: 73 MMHG | HEART RATE: 90 BPM | SYSTOLIC BLOOD PRESSURE: 122 MMHG

## 2023-07-12 DIAGNOSIS — O02.1 MISSED ABORTION: Chronic | ICD-10-CM

## 2023-07-12 DIAGNOSIS — O34.211 MATERNAL CARE FOR LOW TRANSVERSE SCAR FROM PREVIOUS CESAREAN DELIVERY: ICD-10-CM

## 2023-07-12 DIAGNOSIS — Z98.890 OTHER SPECIFIED POSTPROCEDURAL STATES: Chronic | ICD-10-CM

## 2023-07-12 DIAGNOSIS — Z98.891 HISTORY OF UTERINE SCAR FROM PREVIOUS SURGERY: Chronic | ICD-10-CM

## 2023-07-12 LAB
BLD GP AB SCN SERPL QL: NEGATIVE — SIGNIFICANT CHANGE UP
COVID-19 SPIKE DOMAIN AB INTERP: POSITIVE
COVID-19 SPIKE DOMAIN ANTIBODY RESULT: >250 U/ML — HIGH
RH IG SCN BLD-IMP: POSITIVE — SIGNIFICANT CHANGE UP
SARS-COV-2 IGG+IGM SERPL QL IA: >250 U/ML — HIGH
SARS-COV-2 IGG+IGM SERPL QL IA: POSITIVE

## 2023-07-12 RX ORDER — SIMETHICONE 80 MG/1
80 TABLET, CHEWABLE ORAL EVERY 4 HOURS
Refills: 0 | Status: DISCONTINUED | OUTPATIENT
Start: 2023-07-12 | End: 2023-07-14

## 2023-07-12 RX ORDER — ACETAMINOPHEN 500 MG
975 TABLET ORAL
Refills: 0 | Status: DISCONTINUED | OUTPATIENT
Start: 2023-07-12 | End: 2023-07-14

## 2023-07-12 RX ORDER — OXYCODONE HYDROCHLORIDE 5 MG/1
5 TABLET ORAL
Refills: 0 | Status: COMPLETED | OUTPATIENT
Start: 2023-07-12 | End: 2023-07-19

## 2023-07-12 RX ORDER — ONDANSETRON 8 MG/1
4 TABLET, FILM COATED ORAL EVERY 6 HOURS
Refills: 0 | Status: DISCONTINUED | OUTPATIENT
Start: 2023-07-12 | End: 2023-07-13

## 2023-07-12 RX ORDER — SODIUM CHLORIDE 9 MG/ML
1000 INJECTION, SOLUTION INTRAVENOUS ONCE
Refills: 0 | Status: COMPLETED | OUTPATIENT
Start: 2023-07-12 | End: 2023-07-12

## 2023-07-12 RX ORDER — OXYCODONE HYDROCHLORIDE 5 MG/1
10 TABLET ORAL
Refills: 0 | Status: DISCONTINUED | OUTPATIENT
Start: 2023-07-12 | End: 2023-07-13

## 2023-07-12 RX ORDER — NALBUPHINE HYDROCHLORIDE 10 MG/ML
2.5 INJECTION, SOLUTION INTRAMUSCULAR; INTRAVENOUS; SUBCUTANEOUS EVERY 6 HOURS
Refills: 0 | Status: DISCONTINUED | OUTPATIENT
Start: 2023-07-12 | End: 2023-07-13

## 2023-07-12 RX ORDER — CITRIC ACID/SODIUM CITRATE 300-500 MG
15 SOLUTION, ORAL ORAL ONCE
Refills: 0 | Status: COMPLETED | OUTPATIENT
Start: 2023-07-12 | End: 2023-07-12

## 2023-07-12 RX ORDER — HEPARIN SODIUM 5000 [USP'U]/ML
5000 INJECTION INTRAVENOUS; SUBCUTANEOUS EVERY 12 HOURS
Refills: 0 | Status: DISCONTINUED | OUTPATIENT
Start: 2023-07-12 | End: 2023-07-14

## 2023-07-12 RX ORDER — MAGNESIUM HYDROXIDE 400 MG/1
30 TABLET, CHEWABLE ORAL
Refills: 0 | Status: DISCONTINUED | OUTPATIENT
Start: 2023-07-12 | End: 2023-07-14

## 2023-07-12 RX ORDER — CHLORHEXIDINE GLUCONATE 213 G/1000ML
1 SOLUTION TOPICAL ONCE
Refills: 0 | Status: COMPLETED | OUTPATIENT
Start: 2023-07-12 | End: 2023-07-12

## 2023-07-12 RX ORDER — SODIUM CHLORIDE 9 MG/ML
500 INJECTION, SOLUTION INTRAVENOUS ONCE
Refills: 0 | Status: COMPLETED | OUTPATIENT
Start: 2023-07-12 | End: 2023-07-12

## 2023-07-12 RX ORDER — LANOLIN
1 OINTMENT (GRAM) TOPICAL EVERY 6 HOURS
Refills: 0 | Status: DISCONTINUED | OUTPATIENT
Start: 2023-07-12 | End: 2023-07-14

## 2023-07-12 RX ORDER — OXYCODONE HYDROCHLORIDE 5 MG/1
5 TABLET ORAL
Refills: 0 | Status: DISCONTINUED | OUTPATIENT
Start: 2023-07-12 | End: 2023-07-13

## 2023-07-12 RX ORDER — DEXAMETHASONE 0.5 MG/5ML
4 ELIXIR ORAL EVERY 6 HOURS
Refills: 0 | Status: DISCONTINUED | OUTPATIENT
Start: 2023-07-12 | End: 2023-07-13

## 2023-07-12 RX ORDER — MORPHINE SULFATE 50 MG/1
0.1 CAPSULE, EXTENDED RELEASE ORAL ONCE
Refills: 0 | Status: DISCONTINUED | OUTPATIENT
Start: 2023-07-12 | End: 2023-07-13

## 2023-07-12 RX ORDER — DIPHENHYDRAMINE HCL 50 MG
25 CAPSULE ORAL EVERY 6 HOURS
Refills: 0 | Status: DISCONTINUED | OUTPATIENT
Start: 2023-07-12 | End: 2023-07-14

## 2023-07-12 RX ORDER — IBUPROFEN 200 MG
600 TABLET ORAL EVERY 6 HOURS
Refills: 0 | Status: COMPLETED | OUTPATIENT
Start: 2023-07-12 | End: 2024-06-09

## 2023-07-12 RX ORDER — TETANUS TOXOID, REDUCED DIPHTHERIA TOXOID AND ACELLULAR PERTUSSIS VACCINE, ADSORBED 5; 2.5; 8; 8; 2.5 [IU]/.5ML; [IU]/.5ML; UG/.5ML; UG/.5ML; UG/.5ML
0.5 SUSPENSION INTRAMUSCULAR ONCE
Refills: 0 | Status: COMPLETED | OUTPATIENT
Start: 2023-07-12

## 2023-07-12 RX ORDER — NALOXONE HYDROCHLORIDE 4 MG/.1ML
0.1 SPRAY NASAL
Refills: 0 | Status: DISCONTINUED | OUTPATIENT
Start: 2023-07-12 | End: 2023-07-13

## 2023-07-12 RX ORDER — KETOROLAC TROMETHAMINE 30 MG/ML
30 SYRINGE (ML) INJECTION EVERY 6 HOURS
Refills: 0 | Status: DISCONTINUED | OUTPATIENT
Start: 2023-07-12 | End: 2023-07-13

## 2023-07-12 RX ORDER — FAMOTIDINE 10 MG/ML
20 INJECTION INTRAVENOUS ONCE
Refills: 0 | Status: COMPLETED | OUTPATIENT
Start: 2023-07-12 | End: 2023-07-12

## 2023-07-12 RX ADMIN — Medication 30 MILLIGRAM(S): at 21:55

## 2023-07-12 RX ADMIN — Medication 30 MILLIGRAM(S): at 21:15

## 2023-07-12 RX ADMIN — HEPARIN SODIUM 5000 UNIT(S): 5000 INJECTION INTRAVENOUS; SUBCUTANEOUS at 21:16

## 2023-07-12 RX ADMIN — FAMOTIDINE 20 MILLIGRAM(S): 10 INJECTION INTRAVENOUS at 12:21

## 2023-07-12 RX ADMIN — Medication 15 MILLILITER(S): at 12:20

## 2023-07-12 RX ADMIN — SODIUM CHLORIDE 2000 MILLILITER(S): 9 INJECTION, SOLUTION INTRAVENOUS at 12:20

## 2023-07-12 RX ADMIN — ONDANSETRON 4 MILLIGRAM(S): 8 TABLET, FILM COATED ORAL at 17:25

## 2023-07-12 RX ADMIN — CHLORHEXIDINE GLUCONATE 1 APPLICATION(S): 213 SOLUTION TOPICAL at 12:20

## 2023-07-12 RX ADMIN — Medication 4 MILLIGRAM(S): at 20:20

## 2023-07-12 RX ADMIN — SODIUM CHLORIDE 500 MILLILITER(S): 9 INJECTION, SOLUTION INTRAVENOUS at 22:40

## 2023-07-12 NOTE — OB PROVIDER H&P - ASSESSMENT
37 y/o  @ 39.1 weeks admitted for a scheduled rLTCS  -admit to L&D  -routine labs  -NPO bicitra  -EFM/toco  -IV hydration  -ancef  -anesthesia consult  -anticipated c/s    d/w Dr. Prince Mala Tamayo NP

## 2023-07-12 NOTE — OB PROVIDER DELIVERY SUMMARY - NSPROVIDERDELIVERYNOTE_OBGYN_ALL_OB_FT
rLTCS     Viable female infant delivered w/ assistance of [employer]. 3620g. APGARS 9/9.    Grossly normal uterus, bilateral tubes, and ovaries  Hysterotomy closed in x1 layer w/ 1-0 PDS. Hemostasis of left angle obtained via interrupted 1-0 Vicryl   Fascia reapproximated w/ 1-0 Vicryl in a running fashion   SubQ reapproximated w/ 2-0 plain gut in an interrupted fashion   Skin reapproximated w/ 3-0 Monocryl in a subcuticular fashion     148/2000/375    Dictation #: [id number]     Viable female infant delivered w/ assistance of [employer]. 3620g. APGARS 9/9.    Grossly normal uterus, bilateral tubes, and ovaries  Hysterotomy closed in x1 layer w/ 1-0 PDS. Hemostasis of left angle obtained via interrupted 1-0 Vicryl   Fascia reapproximated w/ 1-0 Vicryl in a running fashion   SubQ reapproximated w/ 2-0 plain gut in an interrupted fashion   Skin reapproximated w/ 3-0 Monocryl in a subcuticular fashion     148/2000/375    Dictation #: [id number] rLTCS     Viable female infant delivered w/ assistance of [employer]. 3620g. APGARS 9/9.    Grossly normal uterus, bilateral tubes, and ovaries  Hysterotomy closed in x1 layer w/ 1-0 PDS. Hemostasis of left angle obtained via interrupted 0-0 Vicryl   Fascia reapproximated w/ 0-0 Vicryl in a running fashion   SubQ reapproximated w/ 2-0 plain gut in an interrupted fashion   Skin reapproximated w/ 3-0 Monocryl in a subcuticular fashion     148/2000/375    Dictation #: [id number]

## 2023-07-12 NOTE — OB RN DELIVERY SUMMARY - NSSELHIDDEN_OBGYN_ALL_OB_FT
[NS_DeliveryAttending1_OBGYN_ALL_OB_FT:[id number]=],[NS_DeliveryAssist1_OBGYN_ALL_OB_FT:[id number]=],[NS_DeliveryRN_OBGYN_ALL_OB_FT:[id number]=],[NS_CirculateRN2_OBGYN_ALL_OB_FT:[id number]=]

## 2023-07-12 NOTE — OB PROVIDER H&P - NSLOWPPHRISK_OBGYN_A_OB
Nieves Pregnancy/Less than or equal to 4 previous vaginal births/No known bleeding disorder/No history of postpartum hemorrhage/No other PPH risks indicated

## 2023-07-12 NOTE — OB PROVIDER DELIVERY SUMMARY - NSSELHIDDEN_OBGYN_ALL_OB_FT
[NS_DeliveryAttending1_OBGYN_ALL_OB_FT:[id number]=],[NS_DeliveryAssist1_OBGYN_ALL_OB_FT:[id number]=]

## 2023-07-12 NOTE — OB RN INTRAOPERATIVE NOTE - NSSELHIDDEN_OBGYN_ALL_OB_FT
[NS_DeliveryAttending1_OBGYN_ALL_OB_FT:[id number]],[NS_DeliveryAssist1_OBGYN_ALL_OB_FT:[id number]=],[NS_DeliveryRN_OBGYN_ALL_OB_FT:[id number]=],[NS_CirculateRN2_OBGYN_ALL_OB_FT:[id number]=]

## 2023-07-12 NOTE — OB RN PREOPERATIVE CHECKLIST - NEEDLE GAUGE
Mireya Brown MD VISIT & TX  Chemo as planned  RTC 3 weeks  MD VISIT 7/13/22 @ 10:30AMB TX @ 10AM  AVS PRINTED W/ INSTRUCTIONS AND GIVEN TO PT ON EXIT 18

## 2023-07-12 NOTE — OB PROVIDER H&P - HISTORY OF PRESENT ILLNESS
History and Physical    The patient is a 37 y/o  EDC 2023 @ 39.1 weeks admitted to L&D for a scheduled repeat LTCS. The patient denies contx, LOF, VB. endorses good fetal movement. The patient accepts all blood products    allergies: nkda  meds: PNV, ASA 81mg ( last taken at 37 wks), Fluxetine 20 mg (discontinued at 11 wks)    Medhx; Depression followed by with a therapist.  The patient denies cardiac, pulm, heme, GI, neuro  Obhx: 2017 pLTCS FTP male 8 lbs 3oz c/w chorioamnitis   @19 wks MAB D+E   MAB @ 11wks D+C   MAB @ 11 wks D+C  Gynhx: endometriosis.  The patient denies cysts, fibroids, abn. pap, STDs  Sochx: pt denies  Famhx: pt denies

## 2023-07-12 NOTE — OB PROVIDER H&P - NSHPREVIEWOFSYSTEMS_GEN_ALL_CORE
ICU Vital Signs Last 24 Hrs  T(C): --  T(F): --  HR: 90 (12 Jul 2023 10:56) (90 - 90)  BP: 122/73 (12 Jul 2023 10:56) (122/73 - 122/73)  BP(mean): --  ABP: --  ABP(mean): --  RR: --  SpO2: --    gen: A&Ox3  CV: rrr s1s2  abd: gravid soft  VE: deferred  EFM: 135 moderate variability, + acels, -decels  toco: none

## 2023-07-12 NOTE — OB RN PATIENT PROFILE - NSTOBACCO TYPE_GEN_A_CORE_RD
lab results/return to ED if symptoms worsen, persist or questions arise/radiology results/need for outpatient follow-up
Cigarettes

## 2023-07-12 NOTE — CHART NOTE - NSCHARTNOTEFT_GEN_A_CORE
PA PACU Note    Called to see pt. for mod amt. vaginal bleeding after her second C/S which was scheduled & uneventful.  Pt. in NAD noted to have ~ 100 cc blood mixed with small clots without active bleeding noted and good uterine tone.  VE:  no clots noted with small "stringy" piece of placenta tissue noted in vagina &  none found in uterine cavity  Lilly draining clear yellow dilute urine    ICU Vital Signs Last 24 Hrs  T(C): 37 (12 Jul 2023 13:19), Max: 37.0 (12 Jul 2023 10:57)  T(F): 98.6 (12 Jul 2023 11:48), Max: 98.6 (12 Jul 2023 10:57)  HR: 74 (12 Jul 2023 14:50) (40 - 90)  BP: 98/51 (12 Jul 2023 14:50) (98/51 - 122/73)  BP(mean): 70 (12 Jul 2023 14:50) (70 - 85)  RR: 14 (12 Jul 2023 14:50) (14 - 20)  SpO2: 99% (12 Jul 2023 14:50) (97% - 100%)    O2 Parameters below as of 12 Jul 2023 11:48  Patient On (Oxygen Delivery Method): room air    Cont. observation in PACU.    AZIZA Cabrera  D/W Dr. Chen

## 2023-07-12 NOTE — OB RN PATIENT PROFILE - FALL HARM RISK - UNIVERSAL INTERVENTIONS
Bed in lowest position, wheels locked, appropriate side rails in place/Call bell, personal items and telephone in reach/Instruct patient to call for assistance before getting out of bed or chair/Non-slip footwear when patient is out of bed/[place] to call system/Physically safe environment - no spills, clutter or unnecessary equipment/Purposeful Proactive Rounding/Room/bathroom lighting operational, light cord in reach

## 2023-07-12 NOTE — OB RN DELIVERY SUMMARY - NS_SEPSISRSKCALC_OBGYN_ALL_OB_FT
EOS calculated successfully. EOS Risk Factor: 0.5/1000 live births ([facility] national incidence); GA=39w1d; Temp=98.6; ROM=0; GBS='Negative'; Antibiotics='No antibiotics or any antibiotics < 2 hrs prior to birth'

## 2023-07-13 LAB
BASOPHILS # BLD AUTO: 0.03 K/UL — SIGNIFICANT CHANGE UP (ref 0–0.2)
BASOPHILS NFR BLD AUTO: 0.2 % — SIGNIFICANT CHANGE UP (ref 0–2)
EOSINOPHIL # BLD AUTO: 0 K/UL — SIGNIFICANT CHANGE UP (ref 0–0.5)
EOSINOPHIL NFR BLD AUTO: 0 % — SIGNIFICANT CHANGE UP (ref 0–6)
HCT VFR BLD CALC: 26.7 % — LOW (ref 34.5–45)
HGB BLD-MCNC: 9.2 G/DL — LOW (ref 11.5–15.5)
IMM GRANULOCYTES NFR BLD AUTO: 0.9 % — SIGNIFICANT CHANGE UP (ref 0–0.9)
LYMPHOCYTES # BLD AUTO: 1.74 K/UL — SIGNIFICANT CHANGE UP (ref 1–3.3)
LYMPHOCYTES # BLD AUTO: 10.3 % — LOW (ref 13–44)
MCHC RBC-ENTMCNC: 31.8 PG — SIGNIFICANT CHANGE UP (ref 27–34)
MCHC RBC-ENTMCNC: 34.5 GM/DL — SIGNIFICANT CHANGE UP (ref 32–36)
MCV RBC AUTO: 92.4 FL — SIGNIFICANT CHANGE UP (ref 80–100)
MONOCYTES # BLD AUTO: 0.93 K/UL — HIGH (ref 0–0.9)
MONOCYTES NFR BLD AUTO: 5.5 % — SIGNIFICANT CHANGE UP (ref 2–14)
NEUTROPHILS # BLD AUTO: 14.1 K/UL — HIGH (ref 1.8–7.4)
NEUTROPHILS NFR BLD AUTO: 83.1 % — HIGH (ref 43–77)
NRBC # BLD: 0 /100 WBCS — SIGNIFICANT CHANGE UP (ref 0–0)
PLATELET # BLD AUTO: 259 K/UL — SIGNIFICANT CHANGE UP (ref 150–400)
RBC # BLD: 2.89 M/UL — LOW (ref 3.8–5.2)
RBC # FLD: 13.5 % — SIGNIFICANT CHANGE UP (ref 10.3–14.5)
T PALLIDUM AB TITR SER: NEGATIVE — SIGNIFICANT CHANGE UP
WBC # BLD: 16.96 K/UL — HIGH (ref 3.8–10.5)
WBC # FLD AUTO: 16.96 K/UL — HIGH (ref 3.8–10.5)

## 2023-07-13 RX ORDER — IBUPROFEN 200 MG
600 TABLET ORAL EVERY 6 HOURS
Refills: 0 | Status: DISCONTINUED | OUTPATIENT
Start: 2023-07-13 | End: 2023-07-14

## 2023-07-13 RX ORDER — TETANUS TOXOID, REDUCED DIPHTHERIA TOXOID AND ACELLULAR PERTUSSIS VACCINE, ADSORBED 5; 2.5; 8; 8; 2.5 [IU]/.5ML; [IU]/.5ML; UG/.5ML; UG/.5ML; UG/.5ML
0.5 SUSPENSION INTRAMUSCULAR ONCE
Refills: 0 | Status: COMPLETED | OUTPATIENT
Start: 2023-07-13 | End: 2023-07-13

## 2023-07-13 RX ORDER — OXYCODONE HYDROCHLORIDE 5 MG/1
5 TABLET ORAL
Refills: 0 | Status: DISCONTINUED | OUTPATIENT
Start: 2023-07-13 | End: 2023-07-14

## 2023-07-13 RX ADMIN — Medication 975 MILLIGRAM(S): at 18:51

## 2023-07-13 RX ADMIN — Medication 30 MILLIGRAM(S): at 09:30

## 2023-07-13 RX ADMIN — Medication 30 MILLIGRAM(S): at 16:26

## 2023-07-13 RX ADMIN — Medication 975 MILLIGRAM(S): at 12:30

## 2023-07-13 RX ADMIN — Medication 975 MILLIGRAM(S): at 06:00

## 2023-07-13 RX ADMIN — Medication 975 MILLIGRAM(S): at 06:30

## 2023-07-13 RX ADMIN — HEPARIN SODIUM 5000 UNIT(S): 5000 INJECTION INTRAVENOUS; SUBCUTANEOUS at 21:14

## 2023-07-13 RX ADMIN — Medication 30 MILLIGRAM(S): at 16:02

## 2023-07-13 RX ADMIN — Medication 975 MILLIGRAM(S): at 18:18

## 2023-07-13 RX ADMIN — TETANUS TOXOID, REDUCED DIPHTHERIA TOXOID AND ACELLULAR PERTUSSIS VACCINE, ADSORBED 0.5 MILLILITER(S): 5; 2.5; 8; 8; 2.5 SUSPENSION INTRAMUSCULAR at 22:19

## 2023-07-13 RX ADMIN — Medication 600 MILLIGRAM(S): at 21:14

## 2023-07-13 RX ADMIN — Medication 975 MILLIGRAM(S): at 00:52

## 2023-07-13 RX ADMIN — Medication 600 MILLIGRAM(S): at 22:00

## 2023-07-13 RX ADMIN — Medication 30 MILLIGRAM(S): at 03:15

## 2023-07-13 RX ADMIN — Medication 30 MILLIGRAM(S): at 02:40

## 2023-07-13 RX ADMIN — Medication 975 MILLIGRAM(S): at 00:11

## 2023-07-13 RX ADMIN — HEPARIN SODIUM 5000 UNIT(S): 5000 INJECTION INTRAVENOUS; SUBCUTANEOUS at 09:00

## 2023-07-13 RX ADMIN — Medication 30 MILLIGRAM(S): at 09:00

## 2023-07-13 RX ADMIN — Medication 975 MILLIGRAM(S): at 12:00

## 2023-07-14 ENCOUNTER — TRANSCRIPTION ENCOUNTER (OUTPATIENT)
Age: 38
End: 2023-07-14

## 2023-07-14 VITALS
DIASTOLIC BLOOD PRESSURE: 75 MMHG | SYSTOLIC BLOOD PRESSURE: 112 MMHG | RESPIRATION RATE: 18 BRPM | HEART RATE: 79 BPM | TEMPERATURE: 98 F | OXYGEN SATURATION: 99 %

## 2023-07-14 PROCEDURE — 86850 RBC ANTIBODY SCREEN: CPT

## 2023-07-14 PROCEDURE — 85025 COMPLETE CBC W/AUTO DIFF WBC: CPT

## 2023-07-14 PROCEDURE — 90715 TDAP VACCINE 7 YRS/> IM: CPT

## 2023-07-14 PROCEDURE — 59025 FETAL NON-STRESS TEST: CPT

## 2023-07-14 PROCEDURE — 36415 COLL VENOUS BLD VENIPUNCTURE: CPT

## 2023-07-14 PROCEDURE — 86780 TREPONEMA PALLIDUM: CPT

## 2023-07-14 PROCEDURE — 86901 BLOOD TYPING SEROLOGIC RH(D): CPT

## 2023-07-14 PROCEDURE — 59050 FETAL MONITOR W/REPORT: CPT

## 2023-07-14 PROCEDURE — 86769 SARS-COV-2 COVID-19 ANTIBODY: CPT

## 2023-07-14 PROCEDURE — 86900 BLOOD TYPING SEROLOGIC ABO: CPT

## 2023-07-14 RX ORDER — ASPIRIN/CALCIUM CARB/MAGNESIUM 324 MG
1 TABLET ORAL
Refills: 0 | DISCHARGE

## 2023-07-14 RX ADMIN — Medication 600 MILLIGRAM(S): at 03:08

## 2023-07-14 RX ADMIN — Medication 975 MILLIGRAM(S): at 06:45

## 2023-07-14 RX ADMIN — Medication 975 MILLIGRAM(S): at 00:24

## 2023-07-14 RX ADMIN — Medication 600 MILLIGRAM(S): at 09:41

## 2023-07-14 RX ADMIN — Medication 600 MILLIGRAM(S): at 03:45

## 2023-07-14 RX ADMIN — Medication 975 MILLIGRAM(S): at 12:40

## 2023-07-14 RX ADMIN — Medication 975 MILLIGRAM(S): at 12:00

## 2023-07-14 RX ADMIN — Medication 975 MILLIGRAM(S): at 06:03

## 2023-07-14 RX ADMIN — Medication 600 MILLIGRAM(S): at 10:30

## 2023-07-14 RX ADMIN — Medication 975 MILLIGRAM(S): at 00:56

## 2023-07-14 RX ADMIN — HEPARIN SODIUM 5000 UNIT(S): 5000 INJECTION INTRAVENOUS; SUBCUTANEOUS at 09:41

## 2023-07-14 NOTE — DISCHARGE NOTE OB - CARE PLAN
1 Principal Discharge DX:	Status post repeat low transverse  section  Assessment and plan of treatment:	routine post partum care, nothing per vagina, regular diet

## 2023-07-14 NOTE — PROGRESS NOTE ADULT - PROBLEM SELECTOR PLAN 1
#Postpartum State  Increase OOB  PO Pain Protocol  Continue Regular Diet  Continue Routine Postop/Postpartum Care  Discharge Planning
Increase OOB  D/C IVF  Duramorph  DVT ppx  Dressing removed  ying removed in PACU  Regular diet  AM CBC  Routine Postpartum/Post-op care

## 2023-07-14 NOTE — DISCHARGE NOTE OB - NS MD DC FALL RISK RISK
For information on Fall & Injury Prevention, visit: [email or web address] OR  [email or web address] OR  https://www.cdc.gov/steadi/patient.html

## 2023-07-14 NOTE — PROGRESS NOTE ADULT - NS ATTEND AMEND GEN_ALL_CORE FT
P2 sp repeat c/s  doing well  incision c/d/i  routine post op care  nothing per vagina  for discharge today  jkmr

## 2023-07-14 NOTE — PROGRESS NOTE ADULT - SUBJECTIVE AND OBJECTIVE BOX
Day 1 of Anesthesia Pain Management Service    SUBJECTIVE:  Pain Scale Score:          [X] Refer to charted pain scores    THERAPY: Received PF spinal morphine as above    OBJECTIVE:    Sedation:        	[X] Alert	[ ] Drowsy	[ ] Arousable      [ ] Asleep       [ ] Unresponsive    Side Effects:	[X] None	[ ] Nausea	[ ] Vomiting         [ ] Pruritus  		[ ] Weakness            [ ] Numbness	          [ ] Other:    ASSESSMENT/ PLAN  [X] Patient transitioned to prn analgesics  [X] Pain management per primary service, pain service to sign off   [X]Documentation and Verification of current medications
Day 1 of Anesthesia Pain Management Service    SUBJECTIVE: Doing ok  Pain Scale Score:          [X] Refer to charted pain scores    THERAPY:    s/p    100 mcg PF morphine on 7\12\2023      MEDICATIONS  (STANDING):  acetaminophen     Tablet 975 milliGRAM(s) Oral <User Schedule>  ceFAZolin   IVPB 2000 milliGRAM(s) IV Intermittent once  diphtheria/tetanus/pertussis (acellular) Vaccine (Adacel) 0.5 milliLiter(s) IntraMuscular once  heparin   Injectable 5000 Unit(s) SubCutaneous every 12 hours  ibuprofen  Tablet. 600 milliGRAM(s) Oral every 6 hours  ketorolac   Injectable 30 milliGRAM(s) IV Push every 6 hours  morphine PF Spinal 0.1 milliGRAM(s) IntraThecal. once  oxytocin Infusion 333.333 milliUNIT(s)/Min (1000 mL/Hr) IV Continuous <Continuous>    MEDICATIONS  (PRN):  dexAMETHasone  Injectable 4 milliGRAM(s) IV Push every 6 hours PRN Nausea  diphenhydrAMINE 25 milliGRAM(s) Oral every 6 hours PRN Pruritus  lanolin Ointment 1 Application(s) Topical every 6 hours PRN Sore Nipples  magnesium hydroxide Suspension 30 milliLiter(s) Oral two times a day PRN Constipation  nalbuphine Injectable 2.5 milliGRAM(s) IV Push every 6 hours PRN Pruritus  naloxone Injectable 0.1 milliGRAM(s) IV Push every 3 minutes PRN For ANY of the following changes in patient status:  A. Breaths Per Minute LESS THAN 10, B. Oxygen saturation LESS THAN 90%, C. Sedation score of 6 for Stop After: 4 Times  ondansetron Injectable 4 milliGRAM(s) IV Push every 6 hours PRN Nausea  oxyCODONE    IR 5 milliGRAM(s) Oral every 3 hours PRN Mild Pain (1 - 3)  oxyCODONE    IR 10 milliGRAM(s) Oral every 3 hours PRN Moderate Pain (4 - 6)  oxyCODONE    IR 5 milliGRAM(s) Oral every 3 hours PRN Moderate to Severe Pain (4-10)  simethicone 80 milliGRAM(s) Chew every 4 hours PRN Gas      OBJECTIVE:    Sedation:        	[X] Alert	 [ ] Drowsy	[ ] Arousable      [ ] Asleep       [ ] Unresponsive    Side Effects:	[ ] None 	[X ] Nausea - resolved[ ] Vomiting         [ ] Pruritus  		[ ] Weakness            [ ] Numbness	          [ ] Other:    Vital Signs Last 24 Hrs  T(C): 37.1 (13 Jul 2023 08:48), Max: 37.1 (12 Jul 2023 20:00)  T(F): 98.7 (13 Jul 2023 08:48), Max: 98.7 (12 Jul 2023 20:00)  HR: 72 (13 Jul 2023 08:48) (40 - 90)  BP: 94/57 (13 Jul 2023 08:48) (94/57 - 122/73)  BP(mean): 86 (12 Jul 2023 16:05) (70 - 86)  RR: 17 (13 Jul 2023 08:48) (9 - 24)  SpO2: 97% (13 Jul 2023 08:48) (94% - 100%)    Parameters below as of 13 Jul 2023 08:48  Patient On (Oxygen Delivery Method): room air        ASSESSMENT/ PLAN  [X] Patient to be transitioned to prn analgesics after 24 hours  [X] Pain management per primary service, pain service to sign off   [X]Documentation and Verification of current medications
 Postpartum Note-  Section POD#1      Rubella IgG:    Immune                     RPR:               Negative        Blood Type:    A+    S:Patient is a  38y G  5  P 2   POD#1 S/P    repeat C/Sec  Patient w/o complaints, pain is controlled.  Pt is OOB, tolerating PO, passing flatus. Lochia WNL.   Feeding: Breastfeeding    O:  Vital Signs Last 24 Hrs  T(C): 36.6 (2023 05:21), Max: 37.1 (2023 20:00)  T(F): 97.9 (2023 05:21), Max: 98.7 (2023 20:00)  HR: 69 (2023 05:21) (40 - 90)  BP: 96/59 (2023 05:21) (95/60 - 122/73)  BP(mean): 86 (2023 16:05) (70 - 86)  RR: 17 (2023 07:00) (9 - 24)  SpO2: 96% (2023 05:21) (94% - 100%)    Parameters below as of 2023 05:21  Patient On (Oxygen Delivery Method): room air      I&O's Summary    2023 07:01  -  2023 07:00  --------------------------------------------------------  IN: 1000 mL / OUT: 1078 mL / NET: -78 mL        Gen: NAD  CV: rrr s1s2, CTABL  Abdomen: Soft, nontender, non-distended, fundus firm.  Bowel sounds x 4 quadrants  Incision: Clean, dry, and intact.  Negative erythema/edema/ecchymosis   Sub Q  Lochia WNL  Ext: PAS in place. Negative Homans B/L.  Pedal pulses palpated B/L    LABS:                          9.2    16.96 )-----------( 259      ( 2023 06:33 )             26.7       A/P:  38y  POD # 1 S/P  repeat   section, doing well    PMHx: Obhx: 2017 pLTCS FTP male 8 lbs 3oz c/w chorioamnitis   @19 wks MAB D+E   MAB @ 11wks D+C   MAB @ 11 wks D+C      Current Issues: none    Increase OOB  D/C IVF  Duramorph  DVT ppx  Dressing removed  ying removed in PACU  Regular diet  AM CBC  Routine Postpartum/Post-op care          
Postpartum Note-  Section POD#2    Prenatal Labs  Blood type: A Positive  Rubella IgG: Immune  RPR: Negative          S: Patient w/o complaints, pain is controlled.  Pt is OOB, tolerating PO, passing flatus, and voiding. Lochia WNL.     O:  Vital Signs Last 24 Hrs  T(C): 36.8 (2023 06:12), Max: 37.1 (2023 08:48)  T(F): 98.3 (2023 06:12), Max: 98.7 (2023 08:48)  HR: 75 (2023 06:12) (71 - 78)  BP: 100/64 (2023 06:12) (94/57 - 100/65)  BP(mean): --  RR: 18 (2023 06:12) (17 - 18)  SpO2: 99% (2023 06:12) (97% - 99%)    Parameters below as of 2023 06:12  Patient On (Oxygen Delivery Method): room air        Gen: NAD  Abdomen: Soft, nontender, non distended, fundus firm.  Incision: Clean/dry/ intact. no erythema, no ecchymosis.   SubQ   Staples  Lochia WNL  Ext: Neg calf tenderness    LABS:               9.2    16.96 )-----------( 259      ( 07-13 @ 06:33 )             26.7

## 2023-07-14 NOTE — DISCHARGE NOTE OB - CARE PROVIDER_API CALL
Peggy Wilde  Obstetrics and Gynecology  [address] [facility], [address] [place] [address]  Phone: [phone]  Fax: [phone]  Follow Up Time:

## 2023-07-14 NOTE — DISCHARGE NOTE OB - HOSPITAL COURSE
Patient presented to labor floor for repeat  section. Did well post delivery. see full chart for details. Discharged home pod2
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
## 2023-07-14 NOTE — DISCHARGE NOTE OB - PATIENT PORTAL LINK FT
You can access the FollowMyHealth Patient Portal offered by [facility] by registering at the following website: [email or web address]. By joining [employer]’s FollowMyHealth portal, you will also be able to view your health information using other applications (apps) compatible with our system.

## 2023-08-01 ENCOUNTER — EMERGENCY (EMERGENCY)
Facility: HOSPITAL | Age: 38
LOS: 1 days | Discharge: HOME | End: 2023-08-01
Attending: EMERGENCY MEDICINE
Payer: COMMERCIAL

## 2023-08-01 VITALS
TEMPERATURE: 99 F | SYSTOLIC BLOOD PRESSURE: 138 MMHG | HEIGHT: 65 IN | DIASTOLIC BLOOD PRESSURE: 89 MMHG | RESPIRATION RATE: 20 BRPM | WEIGHT: 158.95 LBS | HEART RATE: 114 BPM | OXYGEN SATURATION: 100 %

## 2023-08-01 DIAGNOSIS — Z98.891 HISTORY OF UTERINE SCAR FROM PREVIOUS SURGERY: Chronic | ICD-10-CM

## 2023-08-01 DIAGNOSIS — O02.1 MISSED ABORTION: Chronic | ICD-10-CM

## 2023-08-01 DIAGNOSIS — Z98.890 OTHER SPECIFIED POSTPROCEDURAL STATES: Chronic | ICD-10-CM

## 2023-08-01 LAB
ALBUMIN SERPL ELPH-MCNC: 4.3 G/DL — SIGNIFICANT CHANGE UP (ref 3.3–5)
ALP SERPL-CCNC: 105 U/L — SIGNIFICANT CHANGE UP (ref 40–120)
ALT FLD-CCNC: 14 U/L — SIGNIFICANT CHANGE UP (ref 10–45)
ANION GAP SERPL CALC-SCNC: 11 MMOL/L — SIGNIFICANT CHANGE UP (ref 5–17)
APTT BLD: 29.1 SEC — SIGNIFICANT CHANGE UP (ref 24.5–35.6)
AST SERPL-CCNC: 15 U/L — SIGNIFICANT CHANGE UP (ref 10–40)
BASOPHILS # BLD AUTO: 0.06 K/UL — SIGNIFICANT CHANGE UP (ref 0–0.2)
BASOPHILS NFR BLD AUTO: 1 % — SIGNIFICANT CHANGE UP (ref 0–2)
BILIRUB SERPL-MCNC: 0.6 MG/DL — SIGNIFICANT CHANGE UP (ref 0.2–1.2)
BUN SERPL-MCNC: 12 MG/DL — SIGNIFICANT CHANGE UP (ref 7–23)
CALCIUM SERPL-MCNC: 9.6 MG/DL — SIGNIFICANT CHANGE UP (ref 8.4–10.5)
CHLORIDE SERPL-SCNC: 105 MMOL/L — SIGNIFICANT CHANGE UP (ref 96–108)
CO2 SERPL-SCNC: 27 MMOL/L — SIGNIFICANT CHANGE UP (ref 22–31)
CREAT SERPL-MCNC: 0.79 MG/DL — SIGNIFICANT CHANGE UP (ref 0.5–1.3)
EGFR: 97 ML/MIN/1.73M2 — SIGNIFICANT CHANGE UP
EOSINOPHIL # BLD AUTO: 0.13 K/UL — SIGNIFICANT CHANGE UP (ref 0–0.5)
EOSINOPHIL NFR BLD AUTO: 2.1 % — SIGNIFICANT CHANGE UP (ref 0–6)
FIBRINOGEN PPP-MCNC: 313 MG/DL — SIGNIFICANT CHANGE UP (ref 200–445)
GLUCOSE SERPL-MCNC: 112 MG/DL — HIGH (ref 70–99)
HCG SERPL-ACNC: 3 MIU/ML — SIGNIFICANT CHANGE UP
HCT VFR BLD CALC: 35.6 % — SIGNIFICANT CHANGE UP (ref 34.5–45)
HGB BLD-MCNC: 11.8 G/DL — SIGNIFICANT CHANGE UP (ref 11.5–15.5)
IMM GRANULOCYTES NFR BLD AUTO: 0.3 % — SIGNIFICANT CHANGE UP (ref 0–0.9)
INR BLD: 0.93 RATIO — SIGNIFICANT CHANGE UP (ref 0.85–1.18)
LDH SERPL L TO P-CCNC: 219 U/L — SIGNIFICANT CHANGE UP (ref 50–242)
LYMPHOCYTES # BLD AUTO: 1.75 K/UL — SIGNIFICANT CHANGE UP (ref 1–3.3)
LYMPHOCYTES # BLD AUTO: 28.9 % — SIGNIFICANT CHANGE UP (ref 13–44)
MAGNESIUM SERPL-MCNC: 2.2 MG/DL — SIGNIFICANT CHANGE UP (ref 1.6–2.6)
MCHC RBC-ENTMCNC: 29.9 PG — SIGNIFICANT CHANGE UP (ref 27–34)
MCHC RBC-ENTMCNC: 33.1 GM/DL — SIGNIFICANT CHANGE UP (ref 32–36)
MCV RBC AUTO: 90.1 FL — SIGNIFICANT CHANGE UP (ref 80–100)
MONOCYTES # BLD AUTO: 0.52 K/UL — SIGNIFICANT CHANGE UP (ref 0–0.9)
MONOCYTES NFR BLD AUTO: 8.6 % — SIGNIFICANT CHANGE UP (ref 2–14)
NEUTROPHILS # BLD AUTO: 3.58 K/UL — SIGNIFICANT CHANGE UP (ref 1.8–7.4)
NEUTROPHILS NFR BLD AUTO: 59.1 % — SIGNIFICANT CHANGE UP (ref 43–77)
NRBC # BLD: 0 /100 WBCS — SIGNIFICANT CHANGE UP (ref 0–0)
PHOSPHATE SERPL-MCNC: 3.2 MG/DL — SIGNIFICANT CHANGE UP (ref 2.5–4.5)
PLATELET # BLD AUTO: 428 K/UL — HIGH (ref 150–400)
POTASSIUM SERPL-MCNC: 4 MMOL/L — SIGNIFICANT CHANGE UP (ref 3.5–5.3)
POTASSIUM SERPL-SCNC: 4 MMOL/L — SIGNIFICANT CHANGE UP (ref 3.5–5.3)
PROT SERPL-MCNC: 6.9 G/DL — SIGNIFICANT CHANGE UP (ref 6–8.3)
PROTHROM AB SERPL-ACNC: 10.3 SEC — SIGNIFICANT CHANGE UP (ref 9.5–13)
RBC # BLD: 3.95 M/UL — SIGNIFICANT CHANGE UP (ref 3.8–5.2)
RBC # FLD: 12.8 % — SIGNIFICANT CHANGE UP (ref 10.3–14.5)
SODIUM SERPL-SCNC: 143 MMOL/L — SIGNIFICANT CHANGE UP (ref 135–145)
URATE SERPL-MCNC: 5.1 MG/DL — SIGNIFICANT CHANGE UP (ref 2.5–7)
WBC # BLD: 6.06 K/UL — SIGNIFICANT CHANGE UP (ref 3.8–10.5)
WBC # FLD AUTO: 6.06 K/UL — SIGNIFICANT CHANGE UP (ref 3.8–10.5)

## 2023-08-01 PROCEDURE — 71275 CT ANGIOGRAPHY CHEST: CPT | Mod: 26,MG

## 2023-08-01 PROCEDURE — 84702 CHORIONIC GONADOTROPIN TEST: CPT

## 2023-08-01 PROCEDURE — 85610 PROTHROMBIN TIME: CPT

## 2023-08-01 PROCEDURE — 85384 FIBRINOGEN ACTIVITY: CPT

## 2023-08-01 PROCEDURE — 84550 ASSAY OF BLOOD/URIC ACID: CPT

## 2023-08-01 PROCEDURE — G1004: CPT

## 2023-08-01 PROCEDURE — 80053 COMPREHEN METABOLIC PANEL: CPT

## 2023-08-01 PROCEDURE — 99285 EMERGENCY DEPT VISIT HI MDM: CPT

## 2023-08-01 PROCEDURE — 83735 ASSAY OF MAGNESIUM: CPT

## 2023-08-01 PROCEDURE — 99285 EMERGENCY DEPT VISIT HI MDM: CPT | Mod: 25

## 2023-08-01 PROCEDURE — 71275 CT ANGIOGRAPHY CHEST: CPT | Mod: MG

## 2023-08-01 PROCEDURE — 70496 CT ANGIOGRAPHY HEAD: CPT | Mod: MG

## 2023-08-01 PROCEDURE — 84100 ASSAY OF PHOSPHORUS: CPT

## 2023-08-01 PROCEDURE — 85025 COMPLETE CBC W/AUTO DIFF WBC: CPT

## 2023-08-01 PROCEDURE — 70496 CT ANGIOGRAPHY HEAD: CPT | Mod: 26,MG

## 2023-08-01 PROCEDURE — 83615 LACTATE (LD) (LDH) ENZYME: CPT

## 2023-08-01 PROCEDURE — 85730 THROMBOPLASTIN TIME PARTIAL: CPT

## 2023-08-01 PROCEDURE — 96374 THER/PROPH/DIAG INJ IV PUSH: CPT | Mod: XU

## 2023-08-01 PROCEDURE — 93005 ELECTROCARDIOGRAM TRACING: CPT

## 2023-08-01 PROCEDURE — 70450 CT HEAD/BRAIN W/O DYE: CPT | Mod: 26,MG,59

## 2023-08-01 PROCEDURE — 70450 CT HEAD/BRAIN W/O DYE: CPT | Mod: MG

## 2023-08-01 RX ORDER — ACETAMINOPHEN 500 MG
1000 TABLET ORAL ONCE
Refills: 0 | Status: COMPLETED | OUTPATIENT
Start: 2023-08-01 | End: 2023-08-01

## 2023-08-01 RX ADMIN — Medication 400 MILLIGRAM(S): at 23:21

## 2023-08-01 NOTE — ED PROVIDER NOTE - PATIENT PORTAL LINK FT
You can access the FollowMyHealth Patient Portal offered by [facility] by registering at the following website: [email or web address]. By joining [employer]’s FollowMyHealth portal, you will also be able to view your health information using other applications (apps) compatible with our system.

## 2023-08-01 NOTE — ED ADULT NURSE NOTE - NSFALLLASTSIX_ED_ALL_ED
Chief Complaint   Patient presents with     Eye Pain Left Eye   Patient presents with a painful left eye. She says it is painful behind the eye and around it than the actual eye itself. She says it started Monday where it was painful and red. Now today it is not painful but she is very light sensitive and her eye is watery.    She has a history of getting migraines but it has been awhile since she has had them     She has not tried using any drops to help with symptoms     Patient does not have any glasses to wear and got a ride here     Do you wear contact lenses? Yes - Has not worn contacts since last night         Eloisa Kay - Optometric Assistant      See Review Of Systems       Medical, surgical and family histories reviewed and updated 12/28/2022.       Could not open this am, light sensitive in L pain in both     OBJECTIVE: See Ophthalmology exam    ASSESSMENT:    ICD-10-CM    1. Acute iritis of left eye  H20.00 prednisoLONE acetate (PRED FORTE) 1 % ophthalmic suspension     cyclopentolate (CYCLOGYL) 1 % ophthalmic solution      2. Acute left eye pain  H57.12 Adult Eye  Referral      3. Redness of eye  H57.89 Adult Eye  Referral         PLAN:  Pf q2h left eye reduce as directed for at least 4 days then q3h   Cyclopentolate two times daily left eye if she is asymptomatic and wants to discontinue earlier if she can discontinue cyclogyl only   discussed near vision blur   Wearing sunglasses   Follow up in one week unless worsens also gave our direct number if not improving or option to be seen at another location    Camila Tolentino OD   
No.

## 2023-08-01 NOTE — ED PROVIDER NOTE - ATTENDING APP SHARED VISIT CONTRIBUTION OF CARE
RGUJRAL 38-year-old female  5 para 3 recent  at 39 weeks IVF pregnancy presents with an episode today of headache with numbness and tingling in her upper extremity and lower extremity followed by brief episodes of shortness of breath.  Patient states symptoms have since resolved with a residual left-sided headache. Denies any change in vision, initial nausea that resolved. Patient denies any recent illness fevers chills no complications during her pregnancy with her blood pressure.  Patient's blood pressure here in the ED is 138/89 and will recheck. Denies any increased bleeding.  On exam, Patient is awake, alert and oriented x 3.  Patient is well appearing and in no acute distress.  NCAT, PERRL, EOMI.  Neck is supple, No LAD.  Lungs are CTA B/L,+S1S2 no murmurs,  Abdomen:Soft nd/nt+bs no rebound or guarding.  Extremity no edema or calf tender.  Skin with no rash.  Neuro CN3-12 intact. Strength 5/5 in upper and lower extremities. Nml Sensation.Gait normal.   Check labs CT head CTV to evaluate for venous thrombus CT chest to rule out PE.  Patient initially tachycardic to 117 will obtain EKG and monitor.

## 2023-08-01 NOTE — ED PROVIDER NOTE - PHYSICAL EXAMINATION
CONSTITUTIONAL: Patient is awake, alert and oriented x 3. Patient is well appearing and in no acute distress  HEAD: NCAT  EYES: PERRL b/l, EOMI  ENT: Airway patent, Nasal mucosa clear. Mouth with normal mucosa. Throat has no vesicles, no oropharyngeal exudates and uvula is midline  NECK: supple, FROM  LUNGS: CTA b/l, no wheezing or rales   HEART: RRR.+S1S2 no murmurs  ABDOMEN: Soft, non-distended, nttp,  no rebound or guarding  EXTREMITY: no edema or calf tenderness b/l, FROM upper and lower ext b/l  SKIN: with no rash or lesions  NEURO: Cn3-12 grossly intact. Strength 5/5 UE/LE b/l .Nml gross sensation UE/LE b/l. Gait normal. Normal finger to nose. No drift

## 2023-08-01 NOTE — ED PROVIDER NOTE - NSFOLLOWUPINSTRUCTIONS_ED_ALL_ED_FT
Please follow up with your primary care doctor within 1 week.  Follow up with your OBGYN within 1 week    *Bring all printed lab/test results to your appointment(s).*    Take acetaminophen 500-1000mg every 6 hrs as needed for pain. DO NOT EXCEED 4000mg DAILY.    Return to the ED for worsening headache, dizziness, nausea, vomiting, loss of consciousness, or any other concerns.

## 2023-08-01 NOTE — ED ADULT NURSE NOTE - OBJECTIVE STATEMENT
Pt presents for eval after an episode in which she had trouble focusing her eyes while trying to text, accompanied by numbness of all extremities, and a h/a on left temple area.,  Sx have resolved and she states she is feeling better.  Able to move all extremities without difficulty.  She reports a similar event recently, which also resolved spontaneously.  Speech clear.  Pt is s/p  .

## 2023-08-01 NOTE — ED ADULT NURSE NOTE - PATIENT'S PREFERRED PRONOUN
Hydroxyzine Pregnancy And Lactation Text: This medication is not safe during pregnancy and should not be taken. It is also excreted in breast milk and breast feeding isn't recommended. Her/She

## 2023-08-01 NOTE — ED ADULT NURSE NOTE - CHIEF COMPLAINT QUOTE
dizziness, shortness of breathe, hard time texting, can't focus on the letters, numbness on both hands and legs onset 1645 today, resolved after 1 1/2 hours s/p C section 7/12.   States it happened also to her 10 days ago.

## 2023-08-01 NOTE — ED ADULT TRIAGE NOTE - CHIEF COMPLAINT QUOTE
dizziness, shortness of breathe, hard time texting, can't focus on the letters, numbness on both hands and legs onset 1645 today, resolved after 1 1/2 hours s/p C section 7/12 dizziness, shortness of breathe, hard time texting, can't focus on the letters, numbness on both hands and legs onset 1645 today, resolved after 1 1/2 hours s/p C section 7/12.   States it happened also to her 10 days ago.

## 2023-08-01 NOTE — ED PROVIDER NOTE - PATIENT'S PREFERRED PRONOUN
Called parent, no answer. Patient is on the schedule today with Zari Guillen. Patient already had a 10 year WCC in May 2022. Left a detailed message informing parent to contact primary to fill out the forms and to cancel this appointment if no concerns.  
Her/She

## 2023-08-01 NOTE — ED PROVIDER NOTE - OBJECTIVE STATEMENT
38-year-old female with recent  on  presents the emergency department complaining of episode of dizziness with headache and numbness sensation to her hands and upper legs.  Patient reports that around 4 PM she had onset of room spinning dizziness.  She was attempting to send a text and felt that she could not focus on the words at that time.  She then had onset of headache in the left temple area followed by a numbness sensation in her bilateral hands and numbness sensation to her bilateral thighs.  Patient reports during this episode she had shortness of breath as well.  She states that the numbness in her legs resolved very fast followed by resolution of numbness in her hands.  Headache and dizziness lasted approximately 1 hour and completely resolved as well.  Patient has never had similar in the past.  Since delivery has been feeling well.  Denies blurry vision, double vision, falls, chest pain, cough, abdominal pain, nausea, vomiting

## 2023-08-01 NOTE — ED PROVIDER NOTE - CROS ED RESP ALL NEG
Routine EEG 8/2020 - photic stimulation did not provoke abnormal discharges. He does not have photosensitive epilepsy.     Exposure to bright blinking lights should not trigger his seizures    negative...

## 2023-08-01 NOTE — ED PROVIDER NOTE - NS ED ATTENDING STATEMENT MOD
This was a shared visit with the ANATOLIY. I reviewed and verified the documentation and independently performed the documented:

## 2023-08-02 ENCOUNTER — NON-APPOINTMENT (OUTPATIENT)
Age: 38
End: 2023-08-02

## 2023-08-02 VITALS
HEART RATE: 69 BPM | OXYGEN SATURATION: 100 % | SYSTOLIC BLOOD PRESSURE: 115 MMHG | RESPIRATION RATE: 16 BRPM | DIASTOLIC BLOOD PRESSURE: 63 MMHG | TEMPERATURE: 98 F

## 2023-08-02 PROBLEM — K21.9 GASTRO-ESOPHAGEAL REFLUX DISEASE WITHOUT ESOPHAGITIS: Chronic | Status: ACTIVE | Noted: 2023-06-26

## 2023-08-24 ENCOUNTER — NON-APPOINTMENT (OUTPATIENT)
Age: 38
End: 2023-08-24

## 2023-08-24 ENCOUNTER — OUTPATIENT (OUTPATIENT)
Dept: OUTPATIENT SERVICES | Facility: HOSPITAL | Age: 38
LOS: 1 days | End: 2023-08-24
Payer: COMMERCIAL

## 2023-08-24 ENCOUNTER — APPOINTMENT (OUTPATIENT)
Dept: INTERNAL MEDICINE | Facility: CLINIC | Age: 38
End: 2023-08-24
Payer: COMMERCIAL

## 2023-08-24 VITALS
BODY MASS INDEX: 25.66 KG/M2 | HEIGHT: 65 IN | DIASTOLIC BLOOD PRESSURE: 70 MMHG | SYSTOLIC BLOOD PRESSURE: 110 MMHG | OXYGEN SATURATION: 99 % | HEART RATE: 113 BPM | WEIGHT: 154 LBS

## 2023-08-24 DIAGNOSIS — Z98.891 HISTORY OF UTERINE SCAR FROM PREVIOUS SURGERY: Chronic | ICD-10-CM

## 2023-08-24 DIAGNOSIS — Z98.890 OTHER SPECIFIED POSTPROCEDURAL STATES: Chronic | ICD-10-CM

## 2023-08-24 DIAGNOSIS — Z00.00 ENCOUNTER FOR GENERAL ADULT MEDICAL EXAMINATION W/OUT ABNORMAL FINDINGS: ICD-10-CM

## 2023-08-24 DIAGNOSIS — K76.89 OTHER SPECIFIED DISEASES OF LIVER: ICD-10-CM

## 2023-08-24 DIAGNOSIS — G44.209 TENSION-TYPE HEADACHE, UNSPECIFIED, NOT INTRACTABLE: ICD-10-CM

## 2023-08-24 DIAGNOSIS — F32.A DEPRESSION, UNSPECIFIED: ICD-10-CM

## 2023-08-24 DIAGNOSIS — R20.0 ANESTHESIA OF SKIN: ICD-10-CM

## 2023-08-24 DIAGNOSIS — O02.1 MISSED ABORTION: Chronic | ICD-10-CM

## 2023-08-24 DIAGNOSIS — I10 ESSENTIAL (PRIMARY) HYPERTENSION: ICD-10-CM

## 2023-08-24 DIAGNOSIS — F41.9 ANXIETY DISORDER, UNSPECIFIED: ICD-10-CM

## 2023-08-24 DIAGNOSIS — R91.1 SOLITARY PULMONARY NODULE: ICD-10-CM

## 2023-08-24 PROCEDURE — 99396 PREV VISIT EST AGE 40-64: CPT | Mod: 25

## 2023-08-24 PROCEDURE — G0463: CPT | Mod: 25

## 2023-08-24 PROCEDURE — 99213 OFFICE O/P EST LOW 20 MIN: CPT | Mod: 25

## 2023-08-24 RX ORDER — IPRATROPIUM BROMIDE 42 UG/1
0.06 SPRAY NASAL 3 TIMES DAILY
Qty: 1 | Refills: 1 | Status: DISCONTINUED | COMMUNITY
Start: 2022-01-12 | End: 2023-08-24

## 2023-08-24 RX ORDER — ELECTROLYTES/DEXTROSE
SOLUTION, ORAL ORAL DAILY
Qty: 30 | Refills: 0 | Status: DISCONTINUED | COMMUNITY
Start: 2023-08-20 | End: 2023-08-24

## 2023-08-24 RX ORDER — SERTRALINE HYDROCHLORIDE 50 MG/1
50 TABLET, FILM COATED ORAL DAILY
Qty: 30 | Refills: 0 | Status: ACTIVE | COMMUNITY
Start: 2023-08-24

## 2023-08-26 PROBLEM — K76.89 LIVER CYST: Status: ACTIVE | Noted: 2023-08-26

## 2023-08-26 PROBLEM — R20.0 NUMBNESS, LIMB: Status: ACTIVE | Noted: 2023-08-26

## 2023-08-26 PROBLEM — G44.209 TENSION-TYPE HEADACHE, NOT INTRACTABLE, UNSPECIFIED CHRONICITY PATTERN: Status: ACTIVE | Noted: 2023-08-26

## 2023-08-26 PROBLEM — R91.1 LUNG NODULE: Status: ACTIVE | Noted: 2023-08-26

## 2023-08-26 RX ORDER — SERTRALINE 25 MG/1
25 TABLET, FILM COATED ORAL
Qty: 60 | Refills: 0 | Status: DISCONTINUED | COMMUNITY
Start: 2023-06-23

## 2023-08-26 NOTE — ASSESSMENT
[FreeTextEntry1] : Ms. SHAISTA ARREGUIN is a 38 year old White  female  with history of De Quervain's disease, depression, headache, palpitation  presented today for comprehensive evaluation.  # HM -COVID: got initial 2 shots and 1 booster. -Flu:did not get it -Tdap: 7/1/22 utd -mammo: will get in Dec 2023 for the first time as age 38 by GYN order -pap smear: to see GYN in Dec 2023.   # s/p ED visit 8/1/23 due to depression/anxiety,  hx of panic attack and recent ED visit. Negative for PE.  Today's PHQ= 0 with sertarline.  F/up web  provider and started Sertraline 50mg po qd for recent 2 weeks denied SI/HI. Aware of our  services for resources.  # postpartum 6 weeks after IVF  Pt reports good family support for her recovery and baby care.  LMP: menses return 8/19/23 Stopped breast feeding after 2 weeks trial as she started sertraline.  # lung nodule, liver cyst 8/1/23 ED [facility] recommended non urgent MRI f/up. Pt is asymptomatic.   # Annual alcohol screen completed this visit. Alcohol use within healthy limits.  Healthy drinking guidelines shared with patient (Female and male overage 65 no more than 3 SSD on any day, no more than 7 per week). Positive reinforcement provided given patient currently mostly within healthy guidelines. She does not drink alcohol at all since pregnancy.   -check lab as planned. -F/up in 1 year or prn.

## 2023-08-26 NOTE — HISTORY OF PRESENT ILLNESS
[FreeTextEntry1] : annual cpe [de-identified] : Ms. SHAISTA ARREGUIN is a 38 year old White  female  with history of De Quervain's disease, depressoin, headache, palpitation  presented today for comprehensive evaluation.  She came alone and in postpartum 6 weeks. She lives with  and a son( 6.6 yo) and a new born babygirl( 6 weeks old). Her daughter was conceived by IVF after lots of effort and healthy. She was 8 lbs at gestational 39 weeks via . LMP 23. She tried breastfeeding for a few weeks then stopped. Her breast got dry. She denied difficulty during pregnancy without preeclapmsia, gestational diabetes. Seen by GYN this  morning and surgical wound healed well. She and  will be happy to have more babies but the IVF was very stressful.   She says that she visited ED [facility] 23 for headache with numbness and tingling in her upper extremity and lower extremity followed by brief episodes of shortness of breath. She thinks it was anxiety related sx.   Reviewed ED document: Normal ECG, Chest CT angio noted with negative for PE and a subcentimeter calcified nodule in RUL and a 2mm nodule in Rt middle lobe, Hepatic cyst 1.4cm. Was recommended non-urgent MRI f/up.  She has Long hx of anxiety sx - occ palpitations with that. off prozac since 2019. She had depression before pregnancy and postpartum depression after having her son.  Has a psychiatrist and therapist to monitor depression and anxiety. Currently f/up Tele medicine with  providers which the provider kept changing. She is getting therapy through the same web platform and expressed feeling okay with current  services. She says that she stopped all medications or supplement except zoloft 50mg po qd 2 weeks ago.  -She got COVID in Dec 2021 with mild sx only and recovered. Denied severe headache, limb tingling sense since ED visit.  -Exercise: walking - wt training 3-5 times a week -Diet: varied diet Denied fever, chills,CP, SOB, abdominal pain, n/v/c/d.

## 2023-08-26 NOTE — HEALTH RISK ASSESSMENT
[Good] : ~his/her~  mood as  good [4 or more  times a week (4 pts)] : 4 or more  times a week (4 points) [1 or 2 (0 pts)] : 1 or 2 (0 points) [No falls in past year] : Patient reported no falls in the past year [0] : 2) Feeling down, depressed, or hopeless: Not at all (0) [PHQ-2 Negative - No further assessment needed] : PHQ-2 Negative - No further assessment needed [Several Days (1)] : 6.) Feeling bad about yourself, or that you are a failure, or have let yourself or your family down? Several days [1/2 of Days or More (2)] : 7.) Trouble concentrating on things, such as reading a newspaper or watching television? Half the days or more [Not at All (0)] : 8.) Moving or speaking so slowly that other people could have noticed, or the opposite, moving or speaking faster than usual? Not at all [Moderate] : severity of depression is moderate [PHQ-9 Positive] : PHQ-9 Positive [Somewhat Difficult] : How difficult have these problems made it for you to do your work, take care of things at home, or get along with people? Somewhat difficult [I have developed a follow-up plan documented below in the note.] : I have developed a follow-up plan documented below in the note. [Patient reported PAP Smear was normal] : Patient reported PAP Smear was normal [HIV test declined] : HIV test declined [Hepatitis C test declined] : Hepatitis C test declined [None] : None [With Family] : lives with family [Employed] : employed [] :  [Sexually Active] : sexually active [Feels Safe at Home] : Feels safe at home [Fully functional (bathing, dressing, toileting, transferring, walking, feeding)] : Fully functional (bathing, dressing, toileting, transferring, walking, feeding) [Fully functional (using the telephone, shopping, preparing meals, housekeeping, doing laundry, using] : Fully functional and needs no help or supervision to perform IADLs (using the telephone, shopping, preparing meals, housekeeping, doing laundry, using transportation, managing medications and managing finances) [Smoke Detector] : smoke detector [Seat Belt] :  uses seat belt [Designated Healthcare Proxy] : Designated healthcare proxy [Name: ___] : Health Care Proxy's Name: [unfilled]  [Relationship: ___] : Relationship: [unfilled] [Intercurrent ED visits] : went to ED [No] : No [Never (0 pts)] : Never (0 points) [# Of Children ___] : has [unfilled] children [Never] : Never [de-identified] : see hpi [Audit-CScore] : 0 [de-identified] : see hpi [de-identified] : see hpi [[id number]] : 0 [[id number]] : 10 [Change in mental status noted] : No change in mental status noted [Reports changes in hearing] : Reports no changes in hearing [Reports changes in vision] : Reports no changes in vision [MammogramComments] : na [PapSmearDate] : 12/19 [ColonoscopyComments] : na [BoneDensityComments] : na [de-identified] : [facility] [de-identified] : working remotely [AdvancecareDate] : 08/23

## 2023-08-26 NOTE — PHYSICAL EXAM
[No Acute Distress] : no acute distress [Well-Appearing] : well-appearing [Normal Sclera/Conjunctiva] : normal sclera/conjunctiva [PERRL] : pupils equal round and reactive to light [EOMI] : extraocular movements intact [No Lymphadenopathy] : no lymphadenopathy [Supple] : supple [No Respiratory Distress] : no respiratory distress  [Clear to Auscultation] : lungs were clear to auscultation bilaterally [Normal Rate] : normal rate  [Regular Rhythm] : with a regular rhythm [Normal S1, S2] : normal S1 and S2 [Normal] : normal rate, regular rhythm, normal S1 and S2 and no murmur heard [No Abdominal Bruit] : a ~M bruit was not heard ~T in the abdomen [Pedal Pulses Present] : the pedal pulses are present [No Edema] : there was no peripheral edema [Normal Appearance] : normal in appearance [No Masses] : no palpable masses [No Axillary Lymphadenopathy] : no axillary lymphadenopathy [Soft] : abdomen soft [Non Tender] : non-tender [Normal Bowel Sounds] : normal bowel sounds [No CVA Tenderness] : no CVA  tenderness [No Spinal Tenderness] : no spinal tenderness [No Joint Swelling] : no joint swelling [Grossly Normal Strength/Tone] : grossly normal strength/tone [No Rash] : no rash [Coordination Grossly Intact] : coordination grossly intact [No Focal Deficits] : no focal deficits [Normal Gait] : normal gait [Normal Affect] : the affect was normal [Deep Tendon Reflexes (DTR)] : deep tendon reflexes were 2+ and symmetric [Normal Insight/Judgement] : insight and judgment were intact [de-identified] : No skin changes [de-identified] :  on lower abdomen healed well.

## 2023-08-29 ENCOUNTER — TRANSCRIPTION ENCOUNTER (OUTPATIENT)
Age: 38
End: 2023-08-29

## 2023-08-29 DIAGNOSIS — K76.89 OTHER SPECIFIED DISEASES OF LIVER: ICD-10-CM

## 2023-08-29 DIAGNOSIS — E78.00 PURE HYPERCHOLESTEROLEMIA, UNSPECIFIED: ICD-10-CM

## 2023-08-29 DIAGNOSIS — G44.209 TENSION-TYPE HEADACHE, UNSPECIFIED, NOT INTRACTABLE: ICD-10-CM

## 2023-08-29 DIAGNOSIS — R91.1 SOLITARY PULMONARY NODULE: ICD-10-CM

## 2023-08-29 DIAGNOSIS — Z00.00 ENCOUNTER FOR GENERAL ADULT MEDICAL EXAMINATION WITHOUT ABNORMAL FINDINGS: ICD-10-CM

## 2023-08-29 DIAGNOSIS — R00.2 PALPITATIONS: ICD-10-CM

## 2023-08-29 DIAGNOSIS — F41.9 ANXIETY DISORDER, UNSPECIFIED: ICD-10-CM

## 2023-08-29 DIAGNOSIS — F32.A DEPRESSION, UNSPECIFIED: ICD-10-CM

## 2023-08-29 DIAGNOSIS — R20.0 ANESTHESIA OF SKIN: ICD-10-CM

## 2023-08-29 LAB
ALBUMIN SERPL ELPH-MCNC: 4.7 G/DL
ALP BLD-CCNC: 102 U/L
ALT SERPL-CCNC: 15 U/L
ANION GAP SERPL CALC-SCNC: 13 MMOL/L
AST SERPL-CCNC: 16 U/L
BILIRUB SERPL-MCNC: 0.9 MG/DL
BUN SERPL-MCNC: 14 MG/DL
CALCIUM SERPL-MCNC: 9.9 MG/DL
CHLORIDE SERPL-SCNC: 103 MMOL/L
CHOLEST SERPL-MCNC: 292 MG/DL
CO2 SERPL-SCNC: 27 MMOL/L
CREAT SERPL-MCNC: 0.84 MG/DL
EGFR: 90 ML/MIN/1.73M2
ESTIMATED AVERAGE GLUCOSE: 97 MG/DL
GLUCOSE SERPL-MCNC: 106 MG/DL
HBA1C MFR BLD HPLC: 5 %
HDLC SERPL-MCNC: 66 MG/DL
LDLC SERPL CALC-MCNC: 206 MG/DL
NONHDLC SERPL-MCNC: 225 MG/DL
POTASSIUM SERPL-SCNC: 4.6 MMOL/L
PROT SERPL-MCNC: 7.3 G/DL
SODIUM SERPL-SCNC: 142 MMOL/L
T4 FREE SERPL-MCNC: 1.3 NG/DL
TRIGL SERPL-MCNC: 110 MG/DL
TSH SERPL-ACNC: 1.37 UIU/ML

## 2023-11-13 NOTE — OB RN DELIVERY SUMMARY - NS_TRANSFUSREACT_OBGYN_ALL_OB
Specialty Pharmacy Refill Coordination Note     Gordon is a 69 y.o. male contacted today regarding refills of capecitabine 1,500 mg PO twice daily specialty medication(s).    Specialty medication(s) and dose(s) confirmed: yes    Refill Questions      Flowsheet Row Most Recent Value   Changes to allergies? No   Changes to medications? No   New conditions since last clinic visit No   Unplanned office visit, urgent care, ED, or hospital admission in the last 4 weeks  No   How does patient/caregiver feel medication is working? Very good   Financial problems or insurance changes  No   Since the previous refill, were any specialty medication doses or scheduled injections missed or delayed?  No   Does this patient require a clinical escalation to a pharmacist? No            Delivery Questions      Flowsheet Row Most Recent Value   Delivery method [employer]   Delivery address correct? Yes   Delivery phone number [phone]   Preferred delivery time? Anytime   Number of medications in delivery 1   Medication(s) being filled and delivered Capecitabine   Doses left of specialty medications about 10 days   Is there any medication that is due not being filled? No   Supplies needed? No supplies needed   Cooler needed? No   Do any medications need mixed or dated? No   Copay form of payment Credit card on file   Additional comments confirmed $3.42 copay via phone with patient   Questions or concerns for the pharmacist? No   Explain any questions or concerns for the pharmacist n/a   Are any medications first time fills? No              Medication Adherence          Adherence tools used: patient uses a pill box to manage medications                      Follow-up: 28 day(s)     Niharika Austin, Pharmacy Technician  Specialty Pharmacy Technician        
No

## 2024-02-14 ENCOUNTER — APPOINTMENT (OUTPATIENT)
Dept: INTERNAL MEDICINE | Facility: CLINIC | Age: 39
End: 2024-02-14
Payer: COMMERCIAL

## 2024-02-14 ENCOUNTER — NON-APPOINTMENT (OUTPATIENT)
Age: 39
End: 2024-02-14

## 2024-02-14 ENCOUNTER — OUTPATIENT (OUTPATIENT)
Dept: OUTPATIENT SERVICES | Facility: HOSPITAL | Age: 39
LOS: 1 days | End: 2024-02-14
Payer: COMMERCIAL

## 2024-02-14 VITALS
BODY MASS INDEX: 22.49 KG/M2 | HEART RATE: 94 BPM | DIASTOLIC BLOOD PRESSURE: 72 MMHG | SYSTOLIC BLOOD PRESSURE: 112 MMHG | HEIGHT: 65 IN | WEIGHT: 135 LBS | OXYGEN SATURATION: 98 %

## 2024-02-14 DIAGNOSIS — Z98.891 HISTORY OF UTERINE SCAR FROM PREVIOUS SURGERY: Chronic | ICD-10-CM

## 2024-02-14 DIAGNOSIS — O02.1 MISSED ABORTION: Chronic | ICD-10-CM

## 2024-02-14 DIAGNOSIS — Z01.818 ENCOUNTER FOR OTHER PREPROCEDURAL EXAMINATION: ICD-10-CM

## 2024-02-14 DIAGNOSIS — Z98.890 OTHER SPECIFIED POSTPROCEDURAL STATES: Chronic | ICD-10-CM

## 2024-02-14 DIAGNOSIS — I10 ESSENTIAL (PRIMARY) HYPERTENSION: ICD-10-CM

## 2024-02-14 PROCEDURE — G0463: CPT

## 2024-02-14 PROCEDURE — 99213 OFFICE O/P EST LOW 20 MIN: CPT

## 2024-02-14 PROCEDURE — 93010 ELECTROCARDIOGRAM REPORT: CPT

## 2024-02-15 LAB
ALBUMIN SERPL ELPH-MCNC: 4.8 G/DL
ALP BLD-CCNC: 85 U/L
ALT SERPL-CCNC: 12 U/L
ANION GAP SERPL CALC-SCNC: 14 MMOL/L
AST SERPL-CCNC: 18 U/L
BILIRUB SERPL-MCNC: 0.7 MG/DL
BUN SERPL-MCNC: 19 MG/DL
CALCIUM SERPL-MCNC: 9.8 MG/DL
CHLORIDE SERPL-SCNC: 98 MMOL/L
CO2 SERPL-SCNC: 28 MMOL/L
CREAT SERPL-MCNC: 0.86 MG/DL
EGFR: 87 ML/MIN/1.73M2
GLUCOSE SERPL-MCNC: 65 MG/DL
HCT VFR BLD CALC: 40.3 %
HGB BLD-MCNC: 12.8 G/DL
MCHC RBC-ENTMCNC: 29.4 PG
MCHC RBC-ENTMCNC: 31.8 GM/DL
MCV RBC AUTO: 92.4 FL
PLATELET # BLD AUTO: 388 K/UL
POTASSIUM SERPL-SCNC: 5.2 MMOL/L
PROT SERPL-MCNC: 7.3 G/DL
RBC # BLD: 4.36 M/UL
RBC # FLD: 13.8 %
SODIUM SERPL-SCNC: 139 MMOL/L
WBC # FLD AUTO: 7.98 K/UL

## 2024-02-16 NOTE — PLAN
[FreeTextEntry1] : Possesses low card/pulm risk for a low risk surgery.  She may proceed.  Please call if any medical issues arise.

## 2024-02-16 NOTE — RESULTS/DATA
[] : results reviewed [de-identified] : NSR 85, nl axis intervals; no acute ST/T changes, normal tracing

## 2024-02-16 NOTE — HISTORY OF PRESENT ILLNESS
[No Pertinent Cardiac History] : no history of aortic stenosis, atrial fibrillation, coronary artery disease, recent myocardial infarction, or implantable device/pacemaker [No Pertinent Pulmonary History] : no history of asthma, COPD, sleep apnea, or smoking [No Adverse Anesthesia Reaction] : no adverse anesthesia reaction in self or family member [Chronic Anticoagulation] : no chronic anticoagulation [Chronic Kidney Disease] : no chronic kidney disease [Diabetes] : no diabetes [FreeTextEntry4] : Having eye lid surgery/blepharoplasty in coming weeks; needs preop evaluation.  She is essentially a healthy woman with excellent activity tolerance; on card/pulm review she has no concerns.

## 2024-02-16 NOTE — PHYSICAL EXAM
[No Acute Distress] : no acute distress [Well Nourished] : well nourished [Well Developed] : well developed [Well-Appearing] : well-appearing [No JVD] : no jugular venous distention [Supple] : supple [No Respiratory Distress] : no respiratory distress  [No Accessory Muscle Use] : no accessory muscle use [Clear to Auscultation] : lungs were clear to auscultation bilaterally [Normal Percussion] : the chest was normal to percussion [Normal Rate] : normal rate  [Regular Rhythm] : with a regular rhythm [Normal S1, S2] : normal S1 and S2 [No Murmur] : no murmur heard [No Carotid Bruits] : no carotid bruits [No Edema] : there was no peripheral edema [No Extremity Clubbing/Cyanosis] : no extremity clubbing/cyanosis [Soft] : abdomen soft [Non Tender] : non-tender [No HSM] : no HSM

## 2024-02-28 DIAGNOSIS — Z01.818 ENCOUNTER FOR OTHER PREPROCEDURAL EXAMINATION: ICD-10-CM

## 2024-05-23 ENCOUNTER — APPOINTMENT (OUTPATIENT)
Dept: PULMONOLOGY | Facility: CLINIC | Age: 39
End: 2024-05-23
Payer: COMMERCIAL

## 2024-05-23 VITALS
WEIGHT: 129 LBS | RESPIRATION RATE: 16 BRPM | TEMPERATURE: 97.6 F | HEART RATE: 91 BPM | DIASTOLIC BLOOD PRESSURE: 74 MMHG | HEIGHT: 65 IN | SYSTOLIC BLOOD PRESSURE: 113 MMHG | OXYGEN SATURATION: 98 % | BODY MASS INDEX: 21.49 KG/M2

## 2024-05-23 DIAGNOSIS — R06.83 SNORING: ICD-10-CM

## 2024-05-23 PROCEDURE — 99203 OFFICE O/P NEW LOW 30 MIN: CPT

## 2024-05-23 NOTE — ASSESSMENT
[FreeTextEntry1] : 39 year old with  De Quervain's disease, depression, anxiety, headache, presented today for sleep evaluation for snoring.  # Snoring - Can do home sleep test to r/o PABLO, low suspicion.  - Can see ENT for nasal congestion and further eval of snoring symptoms.   # Pulm Nodules  - 2 3mm nodules on prior Ct Chest - Does not require follow up yet per Fleischner guidelines - Does not qualify for lung cancer screening at this time.

## 2024-05-23 NOTE — PHYSICAL EXAM
[General Appearance - Well Developed] : well developed [Normal Appearance] : normal appearance [Well Groomed] : well groomed [General Appearance - Well Nourished] : well nourished [No Deformities] : no deformities [General Appearance - In No Acute Distress] : no acute distress [Normal Oropharynx] : normal oropharynx [II] : II [Neck Appearance] : the appearance of the neck was normal [Thyroid Diffuse Enlargement] : the thyroid was not enlarged [Heart Rate And Rhythm] : heart rate was normal and rhythm regular [Heart Sounds] : normal S1 and S2 [Heart Sounds Gallop] : no gallops [Murmurs] : no murmurs [Heart Sounds Pericardial Friction Rub] : no pericardial rub [Auscultation Breath Sounds / Voice Sounds] : lungs were clear to auscultation bilaterally [Nail Clubbing] : no clubbing of the fingernails [Cyanosis, Localized] : no localized cyanosis [Skin Color & Pigmentation] : normal skin color and pigmentation [] : no rash [No Focal Deficits] : no focal deficits [Oriented To Time, Place, And Person] : oriented to person, place, and time [Impaired Insight] : insight and judgment were intact [Affect] : the affect was normal [Mood] : the mood was normal [Low Lying Soft Palate] : no low lying soft palate [Elongated Uvula] : no elongated uvula [Enlarged Base of the Tongue] : no enlargement of the base of the tongue [Erythema] : no erythema of the pharynx [Retrognathia] : no retrognathia [Micrognathia] : no micrognathia

## 2024-05-23 NOTE — END OF VISIT
[] : Fellow [FreeTextEntry3] : The patient has signs and symptoms suggestive of sleep disordered breathing. Therapeutic modalities were discussed with the patient and a home sleep study will be scheduled. Follow up upon completion of sleep study. Refer to ENT for chronic sinus congestion (feels is positional, ?septal deviation) despite nasal sprays.  35 minutes time spent for patient education related to comorbidities and medications, medical records/labs/radiology reviews, preventative care, documentation, coordination of care.

## 2024-05-23 NOTE — HISTORY OF PRESENT ILLNESS
[Snoring] : snoring [To Bed: ___] : ~he/she~ goes to bed at [unfilled] [Arises: ___] : arises at [unfilled] [Sleep Onset Latency: ___ minutes] : sleep onset latency of [unfilled] minutes reported [Nocturnal Awakenings: ___] : ~he/she~ typically has [unfilled] nocturnal awakenings [TST: ___] : Total sleep time is [unfilled] [Daytime Sleep: ___] : daytime sleep: [unfilled] [FreeTextEntry1] : 39 year old White female with history of De Quervain's disease, depression, anxiety, headache, presented today for sleep evaluation for snoring.   Pt presents for evaluation of snoring. She reports snoring since she was a child, but wants to be evaluated for cause of snoring. She notes some nasal congestion and feelings of airflow limitation. She has tried flonase and astepro in the past without improvement in symptoms.   She is otherwise asymptomatic.   Of note, she was a prior smoker 0.75-1ppd, Started at age 14, quit 10 years ago, ~10-15 ppd.        [Witnessed Apneas] : no witnessed sleep apnea [Frequent Nocturnal Awakening] : no nocturnal awakening [Unintentional Sleep while Active] : no unintentional sleep while active [Unintentional Sleep While Inactive] : no unintentional sleep while inactive [Awakes Unrefreshed] : does not awake unrefreshed [Awakes with Headache] : no headache upon awakening [Awakening With Dry Mouth] : no dry mouth upon awakening [Daytime Somnolence] : no daytime somnolence [DIS] : no DIS [DMS] : no DMS [Unusual Sleep Behavior] : no unusual sleep behavior [Hypersomnolence] : no hypersomnolence

## 2024-05-29 NOTE — OB RN PATIENT PROFILE - NS_DATEOFLASTVISIT_OBGYN_ALL_OB_DT
Pt ambulatory to ED with c/o right ankle pain x 2 days. 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 pt was seen and had scans done at urgent care. Pt is on crutches. 07-Jul-2023

## 2024-08-28 ENCOUNTER — APPOINTMENT (OUTPATIENT)
Dept: INTERNAL MEDICINE | Facility: CLINIC | Age: 39
End: 2024-08-28

## 2024-09-11 ENCOUNTER — APPOINTMENT (OUTPATIENT)
Dept: INTERNAL MEDICINE | Facility: CLINIC | Age: 39
End: 2024-09-11
Payer: COMMERCIAL

## 2024-09-11 ENCOUNTER — OUTPATIENT (OUTPATIENT)
Dept: OUTPATIENT SERVICES | Facility: HOSPITAL | Age: 39
LOS: 1 days | End: 2024-09-11
Payer: COMMERCIAL

## 2024-09-11 VITALS
WEIGHT: 128 LBS | OXYGEN SATURATION: 99 % | BODY MASS INDEX: 21.33 KG/M2 | DIASTOLIC BLOOD PRESSURE: 60 MMHG | SYSTOLIC BLOOD PRESSURE: 100 MMHG | HEIGHT: 65 IN | HEART RATE: 69 BPM

## 2024-09-11 DIAGNOSIS — F32.A DEPRESSION, UNSPECIFIED: ICD-10-CM

## 2024-09-11 DIAGNOSIS — R91.1 SOLITARY PULMONARY NODULE: ICD-10-CM

## 2024-09-11 DIAGNOSIS — R93.89 ABNORMAL FINDINGS ON DIAGNOSTIC IMAGING OF OTHER SPECIFIED BODY STRUCTURES: ICD-10-CM

## 2024-09-11 DIAGNOSIS — O02.1 MISSED ABORTION: Chronic | ICD-10-CM

## 2024-09-11 DIAGNOSIS — Z00.00 ENCOUNTER FOR GENERAL ADULT MEDICAL EXAMINATION W/OUT ABNORMAL FINDINGS: ICD-10-CM

## 2024-09-11 DIAGNOSIS — Z98.891 HISTORY OF UTERINE SCAR FROM PREVIOUS SURGERY: Chronic | ICD-10-CM

## 2024-09-11 DIAGNOSIS — R00.2 PALPITATIONS: ICD-10-CM

## 2024-09-11 DIAGNOSIS — Z98.890 OTHER SPECIFIED POSTPROCEDURAL STATES: Chronic | ICD-10-CM

## 2024-09-11 DIAGNOSIS — K76.89 OTHER SPECIFIED DISEASES OF LIVER: ICD-10-CM

## 2024-09-11 PROCEDURE — 99396 PREV VISIT EST AGE 40-64: CPT

## 2024-09-11 PROCEDURE — G0463: CPT

## 2024-09-12 DIAGNOSIS — I10 ESSENTIAL (PRIMARY) HYPERTENSION: ICD-10-CM

## 2024-09-15 NOTE — HEALTH RISK ASSESSMENT
[Good] : ~his/her~  mood as  good [Intercurrent ED visits] : went to ED [1 or 2 (0 pts)] : 1 or 2 (0 points) [Never (0 pts)] : Never (0 points) [No] : In the past 12 months have you used drugs other than those required for medical reasons? No [No falls in past year] : Patient reported no falls in the past year [0] : 2) Feeling down, depressed, or hopeless: Not at all (0) [PHQ-2 Negative - No further assessment needed] : PHQ-2 Negative - No further assessment needed [Several Days (1)] : 6.) Feeling bad about yourself, or that you are a failure, or have let yourself or your family down? Several days [1/2 of Days or More (2)] : 7.) Trouble concentrating on things, such as reading a newspaper or watching television? Half the days or more [Not at All (0)] : 8.) Moving or speaking so slowly that other people could have noticed, or the opposite, moving or speaking faster than usual? Not at all [Moderate] : Severity of Depression is Moderate [Somewhat Difficult] : How difficult have these problems made it for you to do your work, take care of things at home, or get along with people? Somewhat difficult [PHQ-9 Positive] : PHQ-9 Positive [I have developed a follow-up plan documented below in the note.] : I have developed a follow-up plan documented below in the note. [Never] : Never [Patient reported PAP Smear was normal] : Patient reported PAP Smear was normal [HIV test declined] : HIV test declined [Hepatitis C test declined] : Hepatitis C test declined [None] : None [With Family] : lives with family [Employed] : employed [] :  [# Of Children ___] : has [unfilled] children [Sexually Active] : sexually active [Feels Safe at Home] : Feels safe at home [Fully functional (bathing, dressing, toileting, transferring, walking, feeding)] : Fully functional (bathing, dressing, toileting, transferring, walking, feeding) [Fully functional (using the telephone, shopping, preparing meals, housekeeping, doing laundry, using] : Fully functional and needs no help or supervision to perform IADLs (using the telephone, shopping, preparing meals, housekeeping, doing laundry, using transportation, managing medications and managing finances) [Smoke Detector] : smoke detector [Seat Belt] :  uses seat belt [Designated Healthcare Proxy] : Designated healthcare proxy [Name: ___] : Health Care Proxy's Name: [unfilled]  [Relationship: ___] : Relationship: [unfilled] [de-identified] : see hpi [Audit-CScore] : 0 [de-identified] : see hpi [de-identified] : see hpi [RVS6Asjay] : 0 [[id number]] : 10 [Change in mental status noted] : No change in mental status noted [Reports changes in hearing] : Reports no changes in hearing [Reports changes in vision] : Reports no changes in vision [MammogramComments] : na [PapSmearDate] : 12/19 [BoneDensityComments] : na [ColonoscopyComments] : na [de-identified] : [facility] [de-identified] : working remotely [AdvancecareDate] : 08/23

## 2024-09-15 NOTE — PHYSICAL EXAM
[No Acute Distress] : no acute distress [Well-Appearing] : well-appearing [Normal Sclera/Conjunctiva] : normal sclera/conjunctiva [PERRL] : pupils equal round and reactive to light [EOMI] : extraocular movements intact [No Lymphadenopathy] : no lymphadenopathy [Supple] : supple [No Respiratory Distress] : no respiratory distress  [Clear to Auscultation] : lungs were clear to auscultation bilaterally [Normal Rate] : normal rate  [Regular Rhythm] : with a regular rhythm [Normal S1, S2] : normal S1 and S2 [Normal] : normal rate, regular rhythm, normal S1 and S2 and no murmur heard [No Abdominal Bruit] : a ~M bruit was not heard ~T in the abdomen [Pedal Pulses Present] : the pedal pulses are present [No Edema] : there was no peripheral edema [Normal Appearance] : normal in appearance [No Masses] : no palpable masses [No Axillary Lymphadenopathy] : no axillary lymphadenopathy [Soft] : abdomen soft [Non Tender] : non-tender [Normal Bowel Sounds] : normal bowel sounds [No CVA Tenderness] : no CVA  tenderness [No Spinal Tenderness] : no spinal tenderness [No Joint Swelling] : no joint swelling [Grossly Normal Strength/Tone] : grossly normal strength/tone [No Rash] : no rash [Coordination Grossly Intact] : coordination grossly intact [No Focal Deficits] : no focal deficits [Normal Gait] : normal gait [Deep Tendon Reflexes (DTR)] : deep tendon reflexes were 2+ and symmetric [Normal Affect] : the affect was normal [Normal Insight/Judgement] : insight and judgment were intact [de-identified] : No skin changes [de-identified] :  on lower abdomen healed well.

## 2024-09-15 NOTE — ASSESSMENT
[FreeTextEntry1] : Ms. SHAISTA ARREGUIN is a 38 year old White  female  with history of De Quervain's disease, depression, headache, palpitation  presented today for comprehensive evaluation.  # HCM  -COVID: got initial 2 shots and 1 booster. -Flu: did not get it -Tdap: 7/1/22 utd -mammo: Dec 2023 for the first time as age 38 by GYN order: as per pg normal. Asked pt to fax it over to us.  -pap smear: As per pt, seen by GYN in Dec 2023 and normal PAP.  # depression/anxiety,  Today's PHQ= 0 with sertarline 50mg po qd.  F/up web  provider Dr. Eliud Jimenez from [facility].  denied SI/HI. Aware of our  services for resources.  # lung nodule, liver cyst 8/1/23 ED Radilology recommended non urgent MRI f/up. Pt is asymptomatic.  8/1/23 CT chest at [facility] recommended f/u MR for a sub-centimeter calcified nodule in RUL and a 2mm nodule in Rt middle lobe, Hepatic cyst 1.4cm.  Indeterminate 1.4 cm splenic hypodensity. Nonemergent MRI can be obtained for further evaluation as clinically indicated. Made rx for MR chest w/wo IC.  - Will check MRI for lung nodule, spleen abnormality as 8/1/23 ED CT scan recommended. Pending for PA.   Annual alcohol screen completed this visit. Alcohol use within healthy limits.  Healthy drinking guidelines shared with patient (Female and male overage 65 no more than 3 SSD on any day, no more than 7 per week). Positive reinforcement provided given patient currently mostly within healthy guidelines. She does not drink alcohol at all since pregnancy.   -check lab as planned. -F/up in 1 year or prn.

## 2024-09-15 NOTE — PHYSICAL EXAM
[No Acute Distress] : no acute distress [Well-Appearing] : well-appearing [Normal Sclera/Conjunctiva] : normal sclera/conjunctiva [PERRL] : pupils equal round and reactive to light [EOMI] : extraocular movements intact [No Lymphadenopathy] : no lymphadenopathy [Supple] : supple [No Respiratory Distress] : no respiratory distress  [Clear to Auscultation] : lungs were clear to auscultation bilaterally [Normal Rate] : normal rate  [Regular Rhythm] : with a regular rhythm [Normal S1, S2] : normal S1 and S2 [Normal] : normal rate, regular rhythm, normal S1 and S2 and no murmur heard [No Abdominal Bruit] : a ~M bruit was not heard ~T in the abdomen [Pedal Pulses Present] : the pedal pulses are present [No Edema] : there was no peripheral edema [Normal Appearance] : normal in appearance [No Masses] : no palpable masses [No Axillary Lymphadenopathy] : no axillary lymphadenopathy [Soft] : abdomen soft [Non Tender] : non-tender [Normal Bowel Sounds] : normal bowel sounds [No CVA Tenderness] : no CVA  tenderness [No Spinal Tenderness] : no spinal tenderness [No Joint Swelling] : no joint swelling [Grossly Normal Strength/Tone] : grossly normal strength/tone [No Rash] : no rash [Coordination Grossly Intact] : coordination grossly intact [No Focal Deficits] : no focal deficits [Normal Gait] : normal gait [Deep Tendon Reflexes (DTR)] : deep tendon reflexes were 2+ and symmetric [Normal Affect] : the affect was normal [Normal Insight/Judgement] : insight and judgment were intact [de-identified] : No skin changes [de-identified] :  on lower abdomen healed well.

## 2024-09-15 NOTE — HISTORY OF PRESENT ILLNESS
[FreeTextEntry1] : annual cpe [de-identified] : Ms. SHAISTA ARREGUIN is a 39 year old White  female  with history of De Quervain's disease, depression, headache, palpitation  presented today for comprehensive evaluation. Last seen by me for CPE 8/24/23.  She came alone. She lives with  and a son( 7.4 yo) and a babygirl( 1 year old). Her daughter was conceived by IVF after lots of effort and healthy. Reports all family are well and she feels stable.  F/u Dr. Deanne Gonzalez, psychiatrist at [facility] for 1 year and happy with her.  She will see ENT for her snoring. Doubt chance of PABLO.  occasional lightheadedness when getting up or standing up for a few seconds. No palpitation or panic attack.  She lost 26 lbs since we met via working out and diet after delivered her baby.  Reports feeling great.  -Exercise: walking - wt training 3-5 times a week -Diet: varied diet Denied fever, chills,CP, SOB, abdominal pain, n/v/c/d.

## 2024-09-15 NOTE — HEALTH RISK ASSESSMENT
[Good] : ~his/her~  mood as  good [Intercurrent ED visits] : went to ED [1 or 2 (0 pts)] : 1 or 2 (0 points) [Never (0 pts)] : Never (0 points) [No] : In the past 12 months have you used drugs other than those required for medical reasons? No [No falls in past year] : Patient reported no falls in the past year [0] : 2) Feeling down, depressed, or hopeless: Not at all (0) [PHQ-2 Negative - No further assessment needed] : PHQ-2 Negative - No further assessment needed [Several Days (1)] : 6.) Feeling bad about yourself, or that you are a failure, or have let yourself or your family down? Several days [1/2 of Days or More (2)] : 7.) Trouble concentrating on things, such as reading a newspaper or watching television? Half the days or more [Not at All (0)] : 8.) Moving or speaking so slowly that other people could have noticed, or the opposite, moving or speaking faster than usual? Not at all [Moderate] : Severity of Depression is Moderate [Somewhat Difficult] : How difficult have these problems made it for you to do your work, take care of things at home, or get along with people? Somewhat difficult [PHQ-9 Positive] : PHQ-9 Positive [I have developed a follow-up plan documented below in the note.] : I have developed a follow-up plan documented below in the note. [Never] : Never [Patient reported PAP Smear was normal] : Patient reported PAP Smear was normal [HIV test declined] : HIV test declined [Hepatitis C test declined] : Hepatitis C test declined [None] : None [With Family] : lives with family [Employed] : employed [] :  [# Of Children ___] : has [unfilled] children [Sexually Active] : sexually active [Feels Safe at Home] : Feels safe at home [Fully functional (bathing, dressing, toileting, transferring, walking, feeding)] : Fully functional (bathing, dressing, toileting, transferring, walking, feeding) [Fully functional (using the telephone, shopping, preparing meals, housekeeping, doing laundry, using] : Fully functional and needs no help or supervision to perform IADLs (using the telephone, shopping, preparing meals, housekeeping, doing laundry, using transportation, managing medications and managing finances) [Smoke Detector] : smoke detector [Seat Belt] :  uses seat belt [Designated Healthcare Proxy] : Designated healthcare proxy [Name: ___] : Health Care Proxy's Name: [unfilled]  [Relationship: ___] : Relationship: [unfilled] [de-identified] : see hpi [Audit-CScore] : 0 [de-identified] : see hpi [de-identified] : see hpi [QNU5Rdlgd] : 0 [[id number]] : 10 [Change in mental status noted] : No change in mental status noted [Reports changes in hearing] : Reports no changes in hearing [Reports changes in vision] : Reports no changes in vision [MammogramComments] : na [PapSmearDate] : 12/19 [BoneDensityComments] : na [ColonoscopyComments] : na [de-identified] : [facility] [de-identified] : working remotely [AdvancecareDate] : 08/23

## 2024-09-15 NOTE — HISTORY OF PRESENT ILLNESS
[FreeTextEntry1] : annual cpe [de-identified] : Ms. SHAISTA ARREGUIN is a 39 year old White  female  with history of De Quervain's disease, depression, headache, palpitation  presented today for comprehensive evaluation. Last seen by me for CPE 8/24/23.  She came alone. She lives with  and a son( 7.6 yo) and a babygirl( 1 year old). Her daughter was conceived by IVF after lots of effort and healthy. Reports all family are well and she feels stable.  F/u Dr. Deanne Gonzalez, psychiatrist at [facility] for 1 year and happy with her.  She will see ENT for her snoring. Doubt chance of PABLO.  occasional lightheadedness when getting up or standing up for a few seconds. No palpitation or panic attack.  She lost 26 lbs since we met via working out and diet after delivered her baby.  Reports feeling great.  -Exercise: walking - wt training 3-5 times a week -Diet: varied diet Denied fever, chills,CP, SOB, abdominal pain, n/v/c/d.

## 2024-09-15 NOTE — HEALTH RISK ASSESSMENT
[Good] : ~his/her~  mood as  good [Intercurrent ED visits] : went to ED [1 or 2 (0 pts)] : 1 or 2 (0 points) [Never (0 pts)] : Never (0 points) [No] : In the past 12 months have you used drugs other than those required for medical reasons? No [No falls in past year] : Patient reported no falls in the past year [0] : 2) Feeling down, depressed, or hopeless: Not at all (0) [PHQ-2 Negative - No further assessment needed] : PHQ-2 Negative - No further assessment needed [Several Days (1)] : 6.) Feeling bad about yourself, or that you are a failure, or have let yourself or your family down? Several days [1/2 of Days or More (2)] : 7.) Trouble concentrating on things, such as reading a newspaper or watching television? Half the days or more [Not at All (0)] : 8.) Moving or speaking so slowly that other people could have noticed, or the opposite, moving or speaking faster than usual? Not at all [Moderate] : Severity of Depression is Moderate [Somewhat Difficult] : How difficult have these problems made it for you to do your work, take care of things at home, or get along with people? Somewhat difficult [PHQ-9 Positive] : PHQ-9 Positive [I have developed a follow-up plan documented below in the note.] : I have developed a follow-up plan documented below in the note. [Never] : Never [Patient reported PAP Smear was normal] : Patient reported PAP Smear was normal [HIV test declined] : HIV test declined [Hepatitis C test declined] : Hepatitis C test declined [None] : None [With Family] : lives with family [Employed] : employed [] :  [# Of Children ___] : has [unfilled] children [Sexually Active] : sexually active [Feels Safe at Home] : Feels safe at home [Fully functional (bathing, dressing, toileting, transferring, walking, feeding)] : Fully functional (bathing, dressing, toileting, transferring, walking, feeding) [Fully functional (using the telephone, shopping, preparing meals, housekeeping, doing laundry, using] : Fully functional and needs no help or supervision to perform IADLs (using the telephone, shopping, preparing meals, housekeeping, doing laundry, using transportation, managing medications and managing finances) [Smoke Detector] : smoke detector [Seat Belt] :  uses seat belt [Designated Healthcare Proxy] : Designated healthcare proxy [Name: ___] : Health Care Proxy's Name: [unfilled]  [Relationship: ___] : Relationship: [unfilled] [de-identified] : see hpi [Audit-CScore] : 0 [de-identified] : see hpi [de-identified] : see hpi [TPZ1Gaswc] : 0 [[id number]] : 10 [Change in mental status noted] : No change in mental status noted [Reports changes in hearing] : Reports no changes in hearing [Reports changes in vision] : Reports no changes in vision [MammogramComments] : na [PapSmearDate] : 12/19 [BoneDensityComments] : na [ColonoscopyComments] : na [de-identified] : [facility] [de-identified] : working remotely [AdvancecareDate] : 08/23

## 2024-09-15 NOTE — ASSESSMENT
[FreeTextEntry1] : Ms. SHAISTA ARREGUIN is a 38 year old White  female  with history of De Quervain's disease, depression, headache, palpitation  presented today for comprehensive evaluation.  # HCM  -COVID: got initial 2 shots and 1 booster. -Flu: did not get it -Tdap: 7/1/22 utd -mammo: Dec 2023 for the first time as age 38 by GYN order: as per pg normal. Asked pt to fax it over to us.  -pap smear: As per pt, seen by GYN in Dec 2023 and normal PAP.  # depression/anxiety,  Today's PHQ= 0 with sertarline 50mg po qd.  F/up web  provider Dr. Eliud Jimenez from [facility].  denied SI/HI. Aware of our  services for resources.  # lung nodule, liver cyst 8/1/23 ED Radilology recommended non urgent MRI f/up. Pt is asymptomatic.  8/1/23 CT chest at [facility] recommended f/u MR for a sub-centimeter calcified nodule in RUL and a 2mm nodule in Rt middle lobe, Hepatic cyst 1.4cm.  Indeterminate 1.4 cm splenic hypodensity. Nonemergent MRI can be obtained for further evaluation as clinically indicated. Made rx for MR chest w/wo IC.  - Will check MRI for lung nodule, spleen abnormality as 8/1/23 ED CT scan recommended. Pending for PA.   Annual alcohol screen completed this visit. Alcohol use within healthy limits.  Healthy drinking guidelines shared with patient (Female and male overage 65 no more than 3 SSD on any day, no more than 7 per week). Positive reinforcement provided given patient currently mostly within healthy guidelines. She does not drink alcohol at all since pregnancy.   -check lab as planned. -F/up in 1 year or prn.

## 2024-09-15 NOTE — HISTORY OF PRESENT ILLNESS
[FreeTextEntry1] : annual cpe [de-identified] : Ms. SHAISTA ARREGUIN is a 39 year old White  female  with history of De Quervain's disease, depression, headache, palpitation  presented today for comprehensive evaluation. Last seen by me for CPE 8/24/23.  She came alone. She lives with  and a son( 7.4 yo) and a babygirl( 1 year old). Her daughter was conceived by IVF after lots of effort and healthy. Reports all family are well and she feels stable.  F/u Dr. Deanne Gonzalez, psychiatrist at [facility] for 1 year and happy with her.  She will see ENT for her snoring. Doubt chance of PABLO.  occasional lightheadedness when getting up or standing up for a few seconds. No palpitation or panic attack.  She lost 26 lbs since we met via working out and diet after delivered her baby.  Reports feeling great.  -Exercise: walking - wt training 3-5 times a week -Diet: varied diet Denied fever, chills,CP, SOB, abdominal pain, n/v/c/d.

## 2024-09-15 NOTE — ASSESSMENT
[FreeTextEntry1] : Ms. SHAISTA ARREGUIN is a 38 year old White  female  with history of De Quervain's disease, depression, headache, palpitation  presented today for comprehensive evaluation.  # HCM  -COVID: got initial 2 shots and 1 booster. -Flu: did not get it -Tdap: 7/1/22 utd -mammo: Dec 2023 for the first time as age 38 by GYN order: as per pg normal. Asked pt to fax it over to us.  -pap smear: As per pt, seen by GYN in Dec 2023 and normal PAP.  # depression/anxiety,  Today's PHQ= 0 with sertarline 50mg po qd.  F/up web  provider Dr. Eliud Jimenez from [facility].  denied SI/HI. Aware of our  services for resources.  # lung nodule, liver cyst 8/1/23 ED Radilology recommended non urgent MRI f/up. Pt is asymptomatic.  8/1/23 CT chest at [facility] recommended f/u MR for a sub-centimeter calcified nodule in RUL and a 2mm nodule in Rt middle lobe, Hepatic cyst 1.4cm.  Indeterminate 1.4 cm splenic hypodensity. Nonemergent MRI can be obtained for further evaluation as clinically indicated. Made rx for MR chest w/wo IC.  - Will check MRI for lung nodule, spleen abnormality as 8/1/23 ED CT scan recommended. Pending for PA.   Annual alcohol screen completed this visit. Alcohol use within healthy limits.  Healthy drinking guidelines shared with patient (Female and male overage 65 no more than 3 SSD on any day, no more than 7 per week). Positive reinforcement provided given patient currently mostly within healthy guidelines. She does not drink alcohol at all since pregnancy.   -check lab as planned. -F/up in 1 year or prn.

## 2024-09-15 NOTE — HEALTH RISK ASSESSMENT
[Good] : ~his/her~  mood as  good [Intercurrent ED visits] : went to ED [1 or 2 (0 pts)] : 1 or 2 (0 points) [Never (0 pts)] : Never (0 points) [No] : In the past 12 months have you used drugs other than those required for medical reasons? No [No falls in past year] : Patient reported no falls in the past year [0] : 2) Feeling down, depressed, or hopeless: Not at all (0) [PHQ-2 Negative - No further assessment needed] : PHQ-2 Negative - No further assessment needed [Several Days (1)] : 6.) Feeling bad about yourself, or that you are a failure, or have let yourself or your family down? Several days [1/2 of Days or More (2)] : 7.) Trouble concentrating on things, such as reading a newspaper or watching television? Half the days or more [Not at All (0)] : 8.) Moving or speaking so slowly that other people could have noticed, or the opposite, moving or speaking faster than usual? Not at all [Moderate] : Severity of Depression is Moderate [Somewhat Difficult] : How difficult have these problems made it for you to do your work, take care of things at home, or get along with people? Somewhat difficult [PHQ-9 Positive] : PHQ-9 Positive [I have developed a follow-up plan documented below in the note.] : I have developed a follow-up plan documented below in the note. [Never] : Never [Patient reported PAP Smear was normal] : Patient reported PAP Smear was normal [HIV test declined] : HIV test declined [Hepatitis C test declined] : Hepatitis C test declined [None] : None [With Family] : lives with family [Employed] : employed [] :  [# Of Children ___] : has [unfilled] children [Sexually Active] : sexually active [Feels Safe at Home] : Feels safe at home [Fully functional (bathing, dressing, toileting, transferring, walking, feeding)] : Fully functional (bathing, dressing, toileting, transferring, walking, feeding) [Fully functional (using the telephone, shopping, preparing meals, housekeeping, doing laundry, using] : Fully functional and needs no help or supervision to perform IADLs (using the telephone, shopping, preparing meals, housekeeping, doing laundry, using transportation, managing medications and managing finances) [Smoke Detector] : smoke detector [Seat Belt] :  uses seat belt [Designated Healthcare Proxy] : Designated healthcare proxy [Name: ___] : Health Care Proxy's Name: [unfilled]  [Relationship: ___] : Relationship: [unfilled] [de-identified] : see hpi [Audit-CScore] : 0 [de-identified] : see hpi [de-identified] : see hpi [[id number]] : 0 [[id number]] : 10 [Change in mental status noted] : No change in mental status noted [Reports changes in hearing] : Reports no changes in hearing [Reports changes in vision] : Reports no changes in vision [MammogramComments] : na [PapSmearDate] : 12/19 [BoneDensityComments] : na [ColonoscopyComments] : na [de-identified] : [facility] [de-identified] : working remotely [AdvancecareDate] : 08/23

## 2024-09-15 NOTE — PHYSICAL EXAM
[No Acute Distress] : no acute distress [Well-Appearing] : well-appearing [Normal Sclera/Conjunctiva] : normal sclera/conjunctiva [PERRL] : pupils equal round and reactive to light [EOMI] : extraocular movements intact [No Lymphadenopathy] : no lymphadenopathy [Supple] : supple [No Respiratory Distress] : no respiratory distress  [Clear to Auscultation] : lungs were clear to auscultation bilaterally [Normal Rate] : normal rate  [Regular Rhythm] : with a regular rhythm [Normal S1, S2] : normal S1 and S2 [Normal] : normal rate, regular rhythm, normal S1 and S2 and no murmur heard [No Abdominal Bruit] : a ~M bruit was not heard ~T in the abdomen [Pedal Pulses Present] : the pedal pulses are present [No Edema] : there was no peripheral edema [Normal Appearance] : normal in appearance [No Masses] : no palpable masses [No Axillary Lymphadenopathy] : no axillary lymphadenopathy [Soft] : abdomen soft [Non Tender] : non-tender [Normal Bowel Sounds] : normal bowel sounds [No CVA Tenderness] : no CVA  tenderness [No Spinal Tenderness] : no spinal tenderness [No Joint Swelling] : no joint swelling [Grossly Normal Strength/Tone] : grossly normal strength/tone [No Rash] : no rash [Coordination Grossly Intact] : coordination grossly intact [No Focal Deficits] : no focal deficits [Normal Gait] : normal gait [Deep Tendon Reflexes (DTR)] : deep tendon reflexes were 2+ and symmetric [Normal Affect] : the affect was normal [Normal Insight/Judgement] : insight and judgment were intact [de-identified] : No skin changes [de-identified] :  on lower abdomen healed well.

## 2024-09-15 NOTE — HISTORY OF PRESENT ILLNESS
[FreeTextEntry1] : annual cpe [de-identified] : Ms. SHAISTA ARREGUIN is a 39 year old White  female  with history of De Quervain's disease, depression, headache, palpitation  presented today for comprehensive evaluation. Last seen by me for CPE 8/24/23.  She came alone. She lives with  and a son( 7.6 yo) and a babygirl( 1 year old). Her daughter was conceived by IVF after lots of effort and healthy. Reports all family are well and she feels stable.  F/u Dr. Deanne Gonzalez, psychiatrist at [facility] for 1 year and happy with her.  She will see ENT for her snoring. Doubt chance of PABLO.  occasional lightheadedness when getting up or standing up for a few seconds. No palpitation or panic attack.  She lost 26 lbs since we met via working out and diet after delivered her baby.  Reports feeling great.  -Exercise: walking - wt training 3-5 times a week -Diet: varied diet Denied fever, chills,CP, SOB, abdominal pain, n/v/c/d.

## 2024-09-15 NOTE — PHYSICAL EXAM
[No Acute Distress] : no acute distress [Well-Appearing] : well-appearing [Normal Sclera/Conjunctiva] : normal sclera/conjunctiva [PERRL] : pupils equal round and reactive to light [EOMI] : extraocular movements intact [No Lymphadenopathy] : no lymphadenopathy [Supple] : supple [No Respiratory Distress] : no respiratory distress  [Clear to Auscultation] : lungs were clear to auscultation bilaterally [Normal Rate] : normal rate  [Regular Rhythm] : with a regular rhythm [Normal S1, S2] : normal S1 and S2 [Normal] : normal rate, regular rhythm, normal S1 and S2 and no murmur heard [No Abdominal Bruit] : a ~M bruit was not heard ~T in the abdomen [Pedal Pulses Present] : the pedal pulses are present [No Edema] : there was no peripheral edema [Normal Appearance] : normal in appearance [No Masses] : no palpable masses [No Axillary Lymphadenopathy] : no axillary lymphadenopathy [Soft] : abdomen soft [Non Tender] : non-tender [Normal Bowel Sounds] : normal bowel sounds [No CVA Tenderness] : no CVA  tenderness [No Spinal Tenderness] : no spinal tenderness [No Joint Swelling] : no joint swelling [Grossly Normal Strength/Tone] : grossly normal strength/tone [No Rash] : no rash [Coordination Grossly Intact] : coordination grossly intact [No Focal Deficits] : no focal deficits [Normal Gait] : normal gait [Deep Tendon Reflexes (DTR)] : deep tendon reflexes were 2+ and symmetric [Normal Affect] : the affect was normal [Normal Insight/Judgement] : insight and judgment were intact [de-identified] : No skin changes [de-identified] :  on lower abdomen healed well.

## 2024-09-15 NOTE — ASSESSMENT
[FreeTextEntry1] : Ms. SHAISTA ARREGUIN is a 38 year old White  female  with history of De Quervain's disease, depression, headache, palpitation  presented today for comprehensive evaluation.  # HCM  -COVID: got initial 2 shots and 1 booster. -Flu: did not get it -Tdap: 7/1/22 utd -mammo: Dec 2023 for the first time as age 38 by GYN order: as per pg normal. Asked pt to fax it over to us.  -pap smear: As per pt, seen by GYN in Dec 2023 and normal PAP.  # depression/anxiety,  Today's PHQ= 0 with sertarline 50mg po qd.  F/up web  provider Dr. Eliud Jimenez from [facility].  denied SI/HI. Aware of our  services for resources.  # lung nodule, liver cyst 8/1/23 ED Radilology recommended non urgent MRI f/up. Pt is asymptomatic.  8/1/23 CT chest at [facility] recommended f/u MR for a sub-centimeter calcified nodule in RUL and a 2mm nodule in Rt middle lobe, Hepatic cyst 1.4cm.  Indeterminate 1.4 cm splenic hypodensity. Nonemergent MRI can be obtained for further evaluation as clinically indicated. Made rx for MR chest w/wo IC.  - Will check MRI for lung nodule, spleen abnormality as 8/1/23 ED CT scan recommended. Pending for PA.   Annual alcohol screen completed this visit. Alcohol use within healthy limits.  Healthy drinking guidelines shared with patient (Female and male overage 65 no more than 3 SSD on any day, no more than 7 per week). Positive reinforcement provided given patient currently mostly within healthy guidelines. She does not drink alcohol at all since pregnancy.   -check lab as planned. -F/up in 1 year or prn.

## 2024-09-18 ENCOUNTER — TRANSCRIPTION ENCOUNTER (OUTPATIENT)
Age: 39
End: 2024-09-18

## 2024-09-18 LAB
ALBUMIN SERPL ELPH-MCNC: 4.5 G/DL
ALP BLD-CCNC: 62 U/L
ALT SERPL-CCNC: 15 U/L
ANION GAP SERPL CALC-SCNC: 12 MMOL/L
AST SERPL-CCNC: 20 U/L
BASOPHILS # BLD AUTO: 0.06 K/UL
BASOPHILS NFR BLD AUTO: 1.1 %
BILIRUB SERPL-MCNC: 1.5 MG/DL
BUN SERPL-MCNC: 19 MG/DL
CALCIUM SERPL-MCNC: 9.2 MG/DL
CHLORIDE SERPL-SCNC: 101 MMOL/L
CHOLEST SERPL-MCNC: 218 MG/DL
CO2 SERPL-SCNC: 26 MMOL/L
CREAT SERPL-MCNC: 0.82 MG/DL
EGFR: 92 ML/MIN/1.73M2
EOSINOPHIL # BLD AUTO: 0.13 K/UL
EOSINOPHIL NFR BLD AUTO: 2.4 %
ESTIMATED AVERAGE GLUCOSE: 94 MG/DL
GLUCOSE SERPL-MCNC: 72 MG/DL
HBA1C MFR BLD HPLC: 4.9 %
HCT VFR BLD CALC: 37.7 %
HDLC SERPL-MCNC: 80 MG/DL
HGB BLD-MCNC: 12.5 G/DL
IMM GRANULOCYTES NFR BLD AUTO: 0.2 %
LDLC SERPL CALC-MCNC: 127 MG/DL
LYMPHOCYTES # BLD AUTO: 1.81 K/UL
LYMPHOCYTES NFR BLD AUTO: 32.9 %
MAN DIFF?: NORMAL
MCHC RBC-ENTMCNC: 31.2 PG
MCHC RBC-ENTMCNC: 33.2 GM/DL
MCV RBC AUTO: 94 FL
MONOCYTES # BLD AUTO: 0.48 K/UL
MONOCYTES NFR BLD AUTO: 8.7 %
NEUTROPHILS # BLD AUTO: 3.01 K/UL
NEUTROPHILS NFR BLD AUTO: 54.7 %
NONHDLC SERPL-MCNC: 138 MG/DL
PLATELET # BLD AUTO: 292 K/UL
POTASSIUM SERPL-SCNC: 4.3 MMOL/L
PROT SERPL-MCNC: 6.5 G/DL
RBC # BLD: 4.01 M/UL
RBC # FLD: 12.6 %
SODIUM SERPL-SCNC: 139 MMOL/L
T4 FREE SERPL-MCNC: 1.4 NG/DL
TRIGL SERPL-MCNC: 64 MG/DL
TSH SERPL-ACNC: 2.37 UIU/ML
WBC # FLD AUTO: 5.5 K/UL

## 2024-09-24 DIAGNOSIS — K76.89 OTHER SPECIFIED DISEASES OF LIVER: ICD-10-CM

## 2024-09-24 DIAGNOSIS — R91.1 SOLITARY PULMONARY NODULE: ICD-10-CM

## 2024-09-24 DIAGNOSIS — Z00.00 ENCOUNTER FOR GENERAL ADULT MEDICAL EXAMINATION WITHOUT ABNORMAL FINDINGS: ICD-10-CM

## 2024-09-24 DIAGNOSIS — F32.A DEPRESSION, UNSPECIFIED: ICD-10-CM

## 2024-09-24 DIAGNOSIS — R00.2 PALPITATIONS: ICD-10-CM

## 2024-09-24 DIAGNOSIS — R93.89 ABNORMAL FINDINGS ON DIAGNOSTIC IMAGING OF OTHER SPECIFIED BODY STRUCTURES: ICD-10-CM

## 2024-10-01 ENCOUNTER — OUTPATIENT (OUTPATIENT)
Dept: OUTPATIENT SERVICES | Facility: HOSPITAL | Age: 39
LOS: 1 days | End: 2024-10-01
Payer: COMMERCIAL

## 2024-10-01 ENCOUNTER — APPOINTMENT (OUTPATIENT)
Dept: MRI IMAGING | Facility: CLINIC | Age: 39
End: 2024-10-01

## 2024-10-01 DIAGNOSIS — Z00.8 ENCOUNTER FOR OTHER GENERAL EXAMINATION: ICD-10-CM

## 2024-10-01 DIAGNOSIS — R93.89 ABNORMAL FINDINGS ON DIAGNOSTIC IMAGING OF OTHER SPECIFIED BODY STRUCTURES: ICD-10-CM

## 2024-10-01 DIAGNOSIS — O02.1 MISSED ABORTION: Chronic | ICD-10-CM

## 2024-10-01 DIAGNOSIS — Z98.891 HISTORY OF UTERINE SCAR FROM PREVIOUS SURGERY: Chronic | ICD-10-CM

## 2024-10-01 DIAGNOSIS — Z98.890 OTHER SPECIFIED POSTPROCEDURAL STATES: Chronic | ICD-10-CM

## 2024-10-01 PROCEDURE — 71552 MRI CHEST W/O & W/DYE: CPT

## 2024-10-01 PROCEDURE — 71552 MRI CHEST W/O & W/DYE: CPT | Mod: 26

## 2024-10-01 PROCEDURE — A9585: CPT

## 2024-10-16 ENCOUNTER — NON-APPOINTMENT (OUTPATIENT)
Age: 39
End: 2024-10-16

## 2024-10-16 DIAGNOSIS — D73.89 OTHER DISEASES OF SPLEEN: ICD-10-CM

## 2024-10-18 PROBLEM — D73.89 SPLENIC LESION: Status: ACTIVE | Noted: 2024-10-18

## 2024-11-18 NOTE — OB PST NOTE - FALL HARM RISK - CONCLUSION
Medication:  passed protocol.   Last office visit date: 10/3/24  Next appointment scheduled?: Yes   Number of refills given: 3   Universal Safety Interventions

## 2024-11-19 NOTE — H&P PST ADULT - PROBLEM SELECTOR PROBLEM 1
no lesions, no deformities, no traumatic injuries, no significant scars are present, chest wall non-tender, no masses present, breathing is unlabored without accessory muscle use,normal breath sounds
Missed

## 2024-12-02 NOTE — ED ADULT NURSE NOTE - NS ED NURSE LEVEL OF CONSCIOUSNESS AFFECT
A (SYSTEM DELIVERY CMNDR JOHN 3 ULTRA 26MM STRL LF DISP) delivery catheter was inserted. Appropriate

## 2025-02-04 ENCOUNTER — APPOINTMENT (OUTPATIENT)
Dept: BEHAVIORAL HEALTH | Facility: CLINIC | Age: 40
End: 2025-02-04

## 2025-02-04 PROCEDURE — 90853 GROUP PSYCHOTHERAPY: CPT

## 2025-02-11 ENCOUNTER — APPOINTMENT (OUTPATIENT)
Dept: BEHAVIORAL HEALTH | Facility: CLINIC | Age: 40
End: 2025-02-11

## 2025-02-11 PROCEDURE — 90853 GROUP PSYCHOTHERAPY: CPT

## 2025-09-16 ENCOUNTER — APPOINTMENT (OUTPATIENT)
Dept: INTERNAL MEDICINE | Facility: CLINIC | Age: 40
End: 2025-09-16